# Patient Record
Sex: MALE | Race: WHITE | NOT HISPANIC OR LATINO | Employment: FULL TIME | URBAN - METROPOLITAN AREA
[De-identification: names, ages, dates, MRNs, and addresses within clinical notes are randomized per-mention and may not be internally consistent; named-entity substitution may affect disease eponyms.]

---

## 2017-09-11 DIAGNOSIS — E78.5 HYPERLIPIDEMIA, UNSPECIFIED HYPERLIPIDEMIA TYPE: Primary | ICD-10-CM

## 2017-09-16 ENCOUNTER — RESULTS ENCOUNTER (OUTPATIENT)
Dept: INTERNAL MEDICINE | Facility: CLINIC | Age: 52
End: 2017-09-16

## 2017-09-16 DIAGNOSIS — E78.5 HYPERLIPIDEMIA, UNSPECIFIED HYPERLIPIDEMIA TYPE: ICD-10-CM

## 2018-01-29 LAB
ALBUMIN SERPL-MCNC: 4.4 G/DL (ref 3.5–5.2)
ALBUMIN/GLOB SERPL: 2.2 G/DL
ALP SERPL-CCNC: 68 U/L (ref 40–129)
ALT SERPL-CCNC: 18 U/L (ref 5–41)
AST SERPL-CCNC: 16 U/L (ref 5–40)
BASOPHILS # BLD AUTO: 0.03 10*3/MM3 (ref 0–0.2)
BASOPHILS NFR BLD AUTO: 0.6 % (ref 0–2)
BILIRUB SERPL-MCNC: 1 MG/DL (ref 0.2–1.2)
BUN SERPL-MCNC: 16 MG/DL (ref 6–20)
BUN/CREAT SERPL: 16.2 (ref 7–25)
CALCIUM SERPL-MCNC: 8.9 MG/DL (ref 8.6–10.5)
CHLORIDE SERPL-SCNC: 100 MMOL/L (ref 98–107)
CHOLEST SERPL-MCNC: 179 MG/DL (ref 0–200)
CHOLEST/HDLC SERPL: 3.03 {RATIO}
CO2 SERPL-SCNC: 27.1 MMOL/L (ref 22–29)
CREAT SERPL-MCNC: 0.99 MG/DL (ref 0.76–1.27)
EOSINOPHIL # BLD AUTO: 0.22 10*3/MM3 (ref 0.1–0.3)
EOSINOPHIL NFR BLD AUTO: 4.4 % (ref 0–4)
ERYTHROCYTE [DISTWIDTH] IN BLOOD BY AUTOMATED COUNT: 12.6 % (ref 11.5–14.5)
GFR SERPLBLD CREATININE-BSD FMLA CKD-EPI: 79 ML/MIN/1.73
GFR SERPLBLD CREATININE-BSD FMLA CKD-EPI: 96 ML/MIN/1.73
GLOBULIN SER CALC-MCNC: 2 GM/DL
GLUCOSE SERPL-MCNC: 85 MG/DL (ref 65–99)
HCT VFR BLD AUTO: 42.9 % (ref 42–52)
HDLC SERPL-MCNC: 59 MG/DL (ref 40–60)
HGB BLD-MCNC: 14.6 G/DL (ref 14–18)
IMM GRANULOCYTES # BLD: 0.02 10*3/MM3 (ref 0–0.03)
IMM GRANULOCYTES NFR BLD: 0.4 % (ref 0–0.5)
LDLC SERPL CALC-MCNC: 108 MG/DL (ref 0–100)
LYMPHOCYTES # BLD AUTO: 1.79 10*3/MM3 (ref 0.6–4.8)
LYMPHOCYTES NFR BLD AUTO: 35.4 % (ref 20–45)
MCH RBC QN AUTO: 29.2 PG (ref 27–31)
MCHC RBC AUTO-ENTMCNC: 34 G/DL (ref 31–37)
MCV RBC AUTO: 85.8 FL (ref 80–94)
MONOCYTES # BLD AUTO: 0.48 10*3/MM3 (ref 0–1)
MONOCYTES NFR BLD AUTO: 9.5 % (ref 3–8)
NEUTROPHILS # BLD AUTO: 2.51 10*3/MM3 (ref 1.5–8.3)
NEUTROPHILS NFR BLD AUTO: 49.7 % (ref 45–70)
NRBC BLD AUTO-RTO: 0 /100 WBC (ref 0–0)
PLATELET # BLD AUTO: 179 10*3/MM3 (ref 140–500)
POTASSIUM SERPL-SCNC: 4.1 MMOL/L (ref 3.5–5.2)
PROT SERPL-MCNC: 6.4 G/DL (ref 6–8.5)
RBC # BLD AUTO: 5 10*6/MM3 (ref 4.7–6.1)
SODIUM SERPL-SCNC: 136 MMOL/L (ref 136–145)
TRIGL SERPL-MCNC: 60 MG/DL (ref 0–150)
VLDLC SERPL CALC-MCNC: 12 MG/DL (ref 8–32)
WBC # BLD AUTO: 5.05 10*3/MM3 (ref 4.8–10.8)

## 2018-02-05 ENCOUNTER — OFFICE VISIT (OUTPATIENT)
Dept: INTERNAL MEDICINE | Facility: CLINIC | Age: 53
End: 2018-02-05

## 2018-02-05 VITALS
DIASTOLIC BLOOD PRESSURE: 72 MMHG | OXYGEN SATURATION: 98 % | TEMPERATURE: 98.6 F | HEART RATE: 71 BPM | HEIGHT: 70 IN | WEIGHT: 195 LBS | SYSTOLIC BLOOD PRESSURE: 120 MMHG | BODY MASS INDEX: 27.92 KG/M2

## 2018-02-05 DIAGNOSIS — R51.9 HEADACHE IN BACK OF HEAD: ICD-10-CM

## 2018-02-05 DIAGNOSIS — Z12.5 SCREENING PSA (PROSTATE SPECIFIC ANTIGEN): ICD-10-CM

## 2018-02-05 DIAGNOSIS — Z11.59 NEED FOR HEPATITIS C SCREENING TEST: ICD-10-CM

## 2018-02-05 DIAGNOSIS — Z12.11 SCREEN FOR COLON CANCER: ICD-10-CM

## 2018-02-05 DIAGNOSIS — Z82.49 FH: ANEURYSM: ICD-10-CM

## 2018-02-05 DIAGNOSIS — Z00.00 HEALTH CARE MAINTENANCE: Primary | ICD-10-CM

## 2018-02-05 LAB
BILIRUB BLD-MCNC: NEGATIVE MG/DL
CLARITY, POC: CLEAR
COLOR UR: YELLOW
GLUCOSE UR STRIP-MCNC: NEGATIVE MG/DL
KETONES UR QL: NEGATIVE
LEUKOCYTE EST, POC: NEGATIVE
NITRITE UR-MCNC: NEGATIVE MG/ML
PH UR: 5.5 [PH] (ref 5–8)
POC CREATININE URINE: NORMAL
POC MICROALBUMIN URINE: NORMAL
PROT UR STRIP-MCNC: NEGATIVE MG/DL
RBC # UR STRIP: NEGATIVE /UL
SP GR UR: 1.03 (ref 1–1.03)
UROBILINOGEN UR QL: NORMAL

## 2018-02-05 PROCEDURE — 81003 URINALYSIS AUTO W/O SCOPE: CPT | Performed by: NURSE PRACTITIONER

## 2018-02-05 PROCEDURE — 99396 PREV VISIT EST AGE 40-64: CPT | Performed by: NURSE PRACTITIONER

## 2018-02-05 PROCEDURE — 82044 UR ALBUMIN SEMIQUANTITATIVE: CPT | Performed by: NURSE PRACTITIONER

## 2018-02-05 PROCEDURE — 82570 ASSAY OF URINE CREATININE: CPT | Performed by: NURSE PRACTITIONER

## 2018-02-05 PROCEDURE — 93000 ELECTROCARDIOGRAM COMPLETE: CPT | Performed by: NURSE PRACTITIONER

## 2018-02-05 NOTE — PROGRESS NOTES
Procedure     ECG 12 Lead  Date/Time: 2/5/2018 8:29 AM  Performed by: DINESH OCHOA  Authorized by: DINESH OCHOA   Comparison: compared with previous ECG from 6/22/2016  Rhythm: sinus rhythm  Rate: normal  BPM: 64  ST Segments: ST segments normal  T Waves: T waves normal  QRS axis: normal  Other findings comments: SD 0.20 QRS 0.12  Clinical impression: normal ECG

## 2018-02-05 NOTE — PROGRESS NOTES
"Annual Exam      Asad Jones is being seen for a Complete physical exam. His last physical was 6-. He is working full time as a business owner/. He is . His household includes spouse and 3 children. He is exercising regularly. He does not use tobacco. He drinks alcohol rarely.    Colonoscopy was completed never, he was sent to GI last year. Last labs reviewed from 1- included a lipid panel, PSA, CBC, and CMP.     He started WW diet 2 weeks ago, and has lost 7 pounds. He has a twin brother with CAD and had an MI. He is complaining of right sided headache off and on daily.        History of Present Illness       The following portions of the patient's history were reviewed and updated as appropriate: allergies, current medications, past family history, past medical history, past social history, past surgical history and problem list.    Review of Systems   Constitutional: Negative.    HENT: Negative.  Negative for congestion.    Eyes: Negative.    Respiratory: Negative.    Cardiovascular: Negative.  Negative for chest pain, palpitations and leg swelling.   Gastrointestinal: Negative.    Endocrine: Negative.  Negative for cold intolerance.   Genitourinary: Negative.    Musculoskeletal: Negative.    Skin: Negative.    Allergic/Immunologic: Negative.  Negative for environmental allergies.   Neurological: Negative.  Negative for dizziness.   Hematological: Negative.  Negative for adenopathy. Does not bruise/bleed easily.   Psychiatric/Behavioral: Negative.        Objective          /72  Pulse 71  Temp 98.6 °F (37 °C) (Oral)   Ht 177.8 cm (70\")  Wt 88.5 kg (195 lb)  SpO2 98%  BMI 27.98 kg/m2    General Appearance:    Alert, cooperative, no distress, appears stated age   Head:    Normocephalic, without obvious abnormality, atraumatic   Eyes:    PERRL, conjunctiva/corneas clear, EOM's intact, fundi     benign, both eyes        Ears:    Normal TM's and external ear canals, both " ears   Nose:   Nares normal, septum midline, mucosa normal, no drainage    or sinus tenderness   Throat:   Lips, mucosa, and tongue normal; teeth and gums normal   Neck:   Supple, symmetrical, trachea midline, no adenopathy;        thyroid:  No enlargement/tenderness/nodules; no carotid    bruit or JVD   Back:     Symmetric, no curvature, ROM normal, no CVA tenderness   Lungs:     Clear to auscultation bilaterally, respirations unlabored   Chest wall:    No tenderness or deformity   Heart:    Regular rate and rhythm, S1 and S2 normal, no murmur, rub    or gallop   Abdomen:     Soft, non-tender, bowel sounds active all four quadrants,     no masses, no organomegaly   Genitalia:    deferred   Rectal:    deferred   Extremities:   Extremities normal, atraumatic, no cyanosis or edema   Pulses:   2+ and symmetric all extremities   Skin:   Skin color, texture, turgor normal, no rashes or lesions   Lymph nodes:   Cervical, supraclavicular, and axillary nodes normal   Neurologic:   CNII-XII intact. Normal strength, sensation and reflexes      throughout            Assessment/Plan   Asad was seen today for annual exam.    Diagnoses and all orders for this visit:    Health care maintenance  -     ECG 12 Lead    Screening PSA (prostate specific antigen)  -     PSA Screen  -     PSA DIAGNOSTIC    Screen for colon cancer  -     Ambulatory Referral to Gastroenterology    Need for hepatitis C screening test  -     Hepatitis C Antibody    Will Rule Out Aneurysm with MRA due to chronic headache. Follow up with an AWV in 1 year.

## 2018-02-06 LAB
HCV AB S/CO SERPL IA: <0.1 S/CO RATIO (ref 0–0.9)
PSA SERPL-MCNC: 0.61 NG/ML (ref 0–4)

## 2018-02-19 ENCOUNTER — TELEPHONE (OUTPATIENT)
Dept: GASTROENTEROLOGY | Facility: CLINIC | Age: 53
End: 2018-02-19

## 2018-02-19 DIAGNOSIS — Z12.11 COLON CANCER SCREENING: Primary | ICD-10-CM

## 2018-03-01 PROBLEM — Z12.11 COLON CANCER SCREENING: Status: ACTIVE | Noted: 2018-03-01

## 2018-03-01 RX ORDER — PEG-3350, SODIUM SULFATE, SODIUM CHLORIDE, POTASSIUM CHLORIDE, SODIUM ASCORBATE AND ASCORBIC ACID 7.5-2.691G
KIT ORAL
Qty: 1 EACH | Refills: 0 | Status: ON HOLD | OUTPATIENT
Start: 2018-03-01 | End: 2018-03-30

## 2018-03-01 NOTE — TELEPHONE ENCOUNTER
Patient has been scheduled on 3/30/18 to arrive at 9:00am. Instructions have been emailed to them.

## 2018-03-09 ENCOUNTER — HOSPITAL ENCOUNTER (OUTPATIENT)
Dept: MRI IMAGING | Facility: HOSPITAL | Age: 53
Discharge: HOME | End: 2018-03-09
Admitting: NURSE PRACTITIONER

## 2018-03-09 DIAGNOSIS — R51.9 HEADACHE IN BACK OF HEAD: ICD-10-CM

## 2018-03-09 DIAGNOSIS — Z82.49 FH: ANEURYSM: ICD-10-CM

## 2018-03-09 PROCEDURE — 70544 MR ANGIOGRAPHY HEAD W/O DYE: CPT

## 2018-03-29 ENCOUNTER — ANESTHESIA EVENT (OUTPATIENT)
Dept: PERIOP | Facility: HOSPITAL | Age: 53
End: 2018-03-29

## 2018-03-30 ENCOUNTER — ANESTHESIA (OUTPATIENT)
Dept: PERIOP | Facility: HOSPITAL | Age: 53
End: 2018-03-30

## 2018-03-30 ENCOUNTER — HOSPITAL ENCOUNTER (OUTPATIENT)
Facility: HOSPITAL | Age: 53
Setting detail: HOSPITAL OUTPATIENT SURGERY
Discharge: HOME | End: 2018-03-30
Attending: INTERNAL MEDICINE | Admitting: INTERNAL MEDICINE

## 2018-03-30 VITALS
WEIGHT: 183.8 LBS | HEIGHT: 70 IN | HEART RATE: 62 BPM | TEMPERATURE: 98 F | SYSTOLIC BLOOD PRESSURE: 122 MMHG | OXYGEN SATURATION: 95 % | DIASTOLIC BLOOD PRESSURE: 82 MMHG | BODY MASS INDEX: 26.31 KG/M2 | RESPIRATION RATE: 18 BRPM

## 2018-03-30 DIAGNOSIS — Z12.11 COLON CANCER SCREENING: ICD-10-CM

## 2018-03-30 PROCEDURE — 25010000002 PROPOFOL 10 MG/ML EMULSION: Performed by: NURSE ANESTHETIST, CERTIFIED REGISTERED

## 2018-03-30 PROCEDURE — 45380 COLONOSCOPY AND BIOPSY: CPT | Performed by: INTERNAL MEDICINE

## 2018-03-30 RX ORDER — MAGNESIUM HYDROXIDE 1200 MG/15ML
LIQUID ORAL AS NEEDED
Status: DISCONTINUED | OUTPATIENT
Start: 2018-03-30 | End: 2018-03-30 | Stop reason: HOSPADM

## 2018-03-30 RX ORDER — LIDOCAINE HYDROCHLORIDE 10 MG/ML
0.5 INJECTION, SOLUTION EPIDURAL; INFILTRATION; INTRACAUDAL; PERINEURAL ONCE AS NEEDED
Status: COMPLETED | OUTPATIENT
Start: 2018-03-30 | End: 2018-03-30

## 2018-03-30 RX ORDER — LIDOCAINE HYDROCHLORIDE 20 MG/ML
INJECTION, SOLUTION INFILTRATION; PERINEURAL AS NEEDED
Status: DISCONTINUED | OUTPATIENT
Start: 2018-03-30 | End: 2018-03-30 | Stop reason: SURG

## 2018-03-30 RX ORDER — SODIUM CHLORIDE, SODIUM LACTATE, POTASSIUM CHLORIDE, CALCIUM CHLORIDE 600; 310; 30; 20 MG/100ML; MG/100ML; MG/100ML; MG/100ML
9 INJECTION, SOLUTION INTRAVENOUS CONTINUOUS
Status: DISCONTINUED | OUTPATIENT
Start: 2018-03-30 | End: 2018-03-30 | Stop reason: HOSPADM

## 2018-03-30 RX ORDER — SODIUM CHLORIDE 9 MG/ML
40 INJECTION, SOLUTION INTRAVENOUS AS NEEDED
Status: DISCONTINUED | OUTPATIENT
Start: 2018-03-30 | End: 2018-03-30 | Stop reason: HOSPADM

## 2018-03-30 RX ORDER — SODIUM CHLORIDE 0.9 % (FLUSH) 0.9 %
1-10 SYRINGE (ML) INJECTION AS NEEDED
Status: DISCONTINUED | OUTPATIENT
Start: 2018-03-30 | End: 2018-03-30 | Stop reason: HOSPADM

## 2018-03-30 RX ORDER — PROPOFOL 10 MG/ML
VIAL (ML) INTRAVENOUS AS NEEDED
Status: DISCONTINUED | OUTPATIENT
Start: 2018-03-30 | End: 2018-03-30 | Stop reason: SURG

## 2018-03-30 RX ORDER — GLYCOPYRROLATE 0.2 MG/ML
INJECTION INTRAMUSCULAR; INTRAVENOUS AS NEEDED
Status: DISCONTINUED | OUTPATIENT
Start: 2018-03-30 | End: 2018-03-30 | Stop reason: SURG

## 2018-03-30 RX ADMIN — GLYCOPYRROLATE 0.2 MG: 0.2 INJECTION INTRAMUSCULAR; INTRAVENOUS at 09:48

## 2018-03-30 RX ADMIN — LIDOCAINE HYDROCHLORIDE 0.1 ML: 10 INJECTION, SOLUTION EPIDURAL; INFILTRATION; INTRACAUDAL; PERINEURAL at 09:20

## 2018-03-30 RX ADMIN — SODIUM CHLORIDE, POTASSIUM CHLORIDE, SODIUM LACTATE AND CALCIUM CHLORIDE 9 ML/HR: 600; 310; 30; 20 INJECTION, SOLUTION INTRAVENOUS at 09:20

## 2018-03-30 RX ADMIN — PROPOFOL 50 MG: 10 INJECTION, EMULSION INTRAVENOUS at 10:04

## 2018-03-30 RX ADMIN — PROPOFOL 50 MG: 10 INJECTION, EMULSION INTRAVENOUS at 10:12

## 2018-03-30 RX ADMIN — PROPOFOL 50 MG: 10 INJECTION, EMULSION INTRAVENOUS at 09:53

## 2018-03-30 RX ADMIN — PROPOFOL 50 MG: 10 INJECTION, EMULSION INTRAVENOUS at 09:56

## 2018-03-30 RX ADMIN — PROPOFOL 100 MG: 10 INJECTION, EMULSION INTRAVENOUS at 09:51

## 2018-03-30 RX ADMIN — LIDOCAINE HYDROCHLORIDE 100 MG: 20 INJECTION, SOLUTION INFILTRATION; PERINEURAL at 09:51

## 2018-03-30 RX ADMIN — PROPOFOL 50 MG: 10 INJECTION, EMULSION INTRAVENOUS at 10:00

## 2018-03-30 RX ADMIN — PROPOFOL 50 MG: 10 INJECTION, EMULSION INTRAVENOUS at 10:08

## 2018-03-30 NOTE — ANESTHESIA POSTPROCEDURE EVALUATION
Patient: Asad Jones    Procedure Summary     Date:  03/30/18 Room / Location:  [facility] ENDOSCOPY 2 /  [facility]    Anesthesia Start:  0945 Anesthesia Stop:  1019    Procedure:  COLONOSCOPY,  polypectomy (N/A ) Diagnosis:       Colon cancer screening      (Colon cancer screening [Z12.11])    Surgeon:  Danita Springer MD Provider:  Maurizio Hernández CRNA    Anesthesia Type:  MAC ASA Status:  2          Anesthesia Type: MAC  Last vitals  BP   122/82 (03/30/18 1050)   Temp   98 °F (36.7 °C) (03/30/18 1022)   Pulse   62 (03/30/18 1050)   Resp   18 (03/30/18 1050)     SpO2   95 % (03/30/18 1050)     Post Anesthesia Care and Evaluation    Patient location during evaluation: PHASE II  Patient participation: complete - patient participated  Level of consciousness: awake and alert  Pain management: adequate  Airway patency: patent  Anesthetic complications: No anesthetic complications  PONV Status: none  Cardiovascular status: acceptable and hemodynamically stable  Respiratory status: acceptable  Hydration status: acceptable

## 2018-04-03 LAB
LAB AP CASE REPORT: NORMAL
Lab: NORMAL
PATH REPORT.FINAL DX SPEC: NORMAL

## 2018-04-19 ENCOUNTER — OFFICE VISIT (OUTPATIENT)
Dept: INTERNAL MEDICINE | Facility: CLINIC | Age: 53
End: 2018-04-19

## 2018-04-19 VITALS
SYSTOLIC BLOOD PRESSURE: 130 MMHG | HEART RATE: 75 BPM | WEIGHT: 187 LBS | HEIGHT: 70 IN | OXYGEN SATURATION: 98 % | DIASTOLIC BLOOD PRESSURE: 84 MMHG | BODY MASS INDEX: 26.77 KG/M2 | TEMPERATURE: 97.8 F

## 2018-04-19 DIAGNOSIS — M54.32 SCIATICA OF LEFT SIDE: Primary | ICD-10-CM

## 2018-04-19 PROCEDURE — 99213 OFFICE O/P EST LOW 20 MIN: CPT | Performed by: INTERNAL MEDICINE

## 2018-04-19 RX ORDER — PREDNISONE 10 MG/1
TABLET ORAL
Qty: 30 TABLET | Refills: 0 | Status: SHIPPED | OUTPATIENT
Start: 2018-04-19 | End: 2019-04-17

## 2018-04-19 RX ORDER — GABAPENTIN 300 MG/1
300 CAPSULE ORAL NIGHTLY
Qty: 30 CAPSULE | Refills: 0 | Status: SHIPPED | OUTPATIENT
Start: 2018-04-19 | End: 2018-05-21 | Stop reason: SDUPTHER

## 2018-04-19 NOTE — PATIENT INSTRUCTIONS
Sciatica Rehab  Ask your health care provider which exercises are safe for you. Do exercises exactly as told by your health care provider and adjust them as directed. It is normal to feel mild stretching, pulling, tightness, or discomfort as you do these exercises, but you should stop right away if you feel sudden pain or your pain gets worse. Do not begin these exercises until told by your health care provider.  Stretching and range of motion exercises  These exercises warm up your muscles and joints and improve the movement and flexibility of your hips and your back. These exercises also help to relieve pain, numbness, and tingling.  Exercise A: Sciatic nerve glide   1. Sit in a chair with your head facing down toward your chest. Place your hands behind your back. Let your shoulders slump forward.  2. Slowly straighten one of your knees while you tilt your head back as if you are looking toward the ceiling. Only straighten your leg as far as you can without making your symptoms worse.  3. Hold for __________ seconds.  4. Slowly return to the starting position.  5. Repeat with your other leg.  Repeat __________ times. Complete this exercise __________ times a day.  Exercise B: Knee to chest with hip adduction and internal rotation     1. Lie on your back on a firm surface with both legs straight.  2. Bend one of your knees and move it up toward your chest until you feel a gentle stretch in your lower back and buttock. Then, move your knee toward the shoulder that is on the opposite side from your leg.  ¨ Hold your leg in this position by holding onto the front of your knee.  3. Hold for __________ seconds.  4. Slowly return to the starting position.  5. Repeat with your other leg.  Repeat __________ times. Complete this exercise __________ times a day.  Exercise C: Prone extension on elbows     1. Lie on your abdomen on a firm surface. A bed may be too soft for this exercise.  2. Prop yourself up on your  elbows.  3. Use your arms to help lift your chest up until you feel a gentle stretch in your abdomen and your lower back.  ¨ This will place some of your body weight on your elbows. If this is uncomfortable, try stacking pillows under your chest.  ¨ Your hips should stay down, against the surface that you are lying on. Keep your hip and back muscles relaxed.  4. Hold for __________ seconds.  5. Slowly relax your upper body and return to the starting position.  Repeat __________ times. Complete this exercise __________ times a day.  Strengthening exercises  These exercises build strength and endurance in your back. Endurance is the ability to use your muscles for a long time, even after they get tired.  Exercise D: Pelvic tilt   1. Lie on your back on a firm surface. Bend your knees and keep your feet flat.  2. Tense your abdominal muscles. Tip your pelvis up toward the ceiling and flatten your lower back into the floor.  ¨ To help with this exercise, you may place a small towel under your lower back and try to push your back into the towel.  3. Hold for __________ seconds.  4. Let your muscles relax completely before you repeat this exercise.  Repeat __________ times. Complete this exercise __________ times a day.  Exercise E: Alternating arm and leg raises     1. Get on your hands and knees on a firm surface. If you are on a hard floor, you may want to use padding to cushion your knees, such as an exercise mat.  2. Line up your arms and legs. Your hands should be below your shoulders, and your knees should be below your hips.  3. Lift your left leg behind you. At the same time, raise your right arm and straighten it in front of you.  ¨ Do not lift your leg higher than your hip.  ¨ Do not lift your arm higher than your shoulder.  ¨ Keep your abdominal and back muscles tight.  ¨ Keep your hips facing the ground.  ¨ Do not arch your back.  ¨ Keep your balance carefully, and do not hold your breath.  4. Hold for  __________ seconds.  5. Slowly return to the starting position and repeat with your right leg and your left arm.  Repeat __________ times. Complete this exercise __________ times a day.  Posture and body mechanics     Body mechanics refers to the movements and positions of your body while you do your daily activities. Posture is part of body mechanics. Good posture and healthy body mechanics can help to relieve stress in your body's tissues and joints. Good posture means that your spine is in its natural S-curve position (your spine is neutral), your shoulders are pulled back slightly, and your head is not tipped forward. The following are general guidelines for applying improved posture and body mechanics to your everyday activities.  Standing     · When standing, keep your spine neutral and your feet about hip-width apart. Keep a slight bend in your knees. Your ears, shoulders, and hips should line up.  · When you do a task in which you  one place for a long time, place one foot up on a stable object that is 2-4 inches (5-10 cm) high, such as a footstool. This helps keep your spine neutral.  Sitting     · When sitting, keep your spine neutral and keep your feet flat on the floor. Use a footrest, if necessary, and keep your thighs parallel to the floor. Avoid rounding your shoulders, and avoid tilting your head forward.  · When working at a desk or a computer, keep your desk at a height where your hands are slightly lower than your elbows. Slide your chair under your desk so you are close enough to maintain good posture.  · When working at a computer, place your monitor at a height where you are looking straight ahead and you do not have to tilt your head forward or downward to look at the screen.  Resting     · When lying down and resting, avoid positions that are most painful for you.  · If you have pain with activities such as sitting, bending, stooping, or squatting (flexion-based activities), lie in a  position in which your body does not bend very much. For example, avoid curling up on your side with your arms and knees near your chest (fetal position).  · If you have pain with activities such as standing for a long time or reaching with your arms (extension-based activities), lie with your spine in a neutral position and bend your knees slightly. Try the following positions:  ¨ Lying on your side with a pillow between your knees.  ¨ Lying on your back with a pillow under your knees.  Lifting     · When lifting objects, keep your feet at least shoulder-width apart and tighten your abdominal muscles.  · Bend your knees and hips and keep your spine neutral. It is important to lift using the strength of your legs, not your back. Do not lock your knees straight out.  · Always ask for help to lift heavy or awkward objects.  This information is not intended to replace advice given to you by your health care provider. Make sure you discuss any questions you have with your health care provider.  Document Released: 12/18/2006 Document Revised: 08/24/2017 Document Reviewed: 09/02/2016  [employer]vier Interactive Patient Education © 2017 [employer]

## 2018-04-19 NOTE — PROGRESS NOTES
"      Asad Jones is a 53 y.o. male, who presents with a chief complaint of   Chief Complaint   Patient presents with   • Leg Pain     L leg, sharp, throbbing pain at times, patient states feels like poss pulled muscle, cannot sleep at night        54 yo M here for left sided leg pain that feels sharp and \"twinges\". Worse with laying in bed or stretching out in his recliner at home. Has been going on for about one week. No back pain now, he is a  for a living. No swelling of the leg or redness. Has not taken anything for the pain. Better with walking. No blood clots in his family. No recent injuries.          The following portions of the patient's history were reviewed and updated as appropriate: allergies, current medications, past family history, past medical history, past social history, past surgical history and problem list.    Allergies: Naproxen    Current Outpatient Prescriptions:   •  aspirin 81 MG EC tablet, Take 81 mg by mouth daily., Disp: , Rfl:   •  Multiple Vitamin (MULTI-VITAMIN DAILY PO), Take 1 tablet by mouth Daily., Disp: , Rfl:   •  Red Yeast Rice 600 MG tablet, Take 1 tablet by mouth daily., Disp: 30 tablet, Rfl: 0  •  gabapentin (NEURONTIN) 300 MG capsule, Take 1 capsule by mouth Every Night., Disp: 30 capsule, Rfl: 0  •  predniSONE (DELTASONE) 10 MG tablet, 50mg PO Qam x 2 days, 40mg PO Qam x 2 days, 30mg PO Qam x 2 days, 20mg PO Qam x 2 days, 10mg PO Qam x 2 days,, Disp: 30 tablet, Rfl: 0  There are no discontinued medications.    Review of Systems   Constitutional: Negative for chills, fatigue and fever.   Respiratory: Negative for shortness of breath.    Musculoskeletal: Positive for myalgias (left leg pain ). Negative for back pain.             /84 (BP Location: Right arm, Patient Position: Sitting, Cuff Size: Adult)   Pulse 75   Temp 97.8 °F (36.6 °C) (Oral)   Ht 177.8 cm (70\")   Wt 84.8 kg (187 lb)   SpO2 98%   BMI 26.83 kg/m²       Physical Exam "   Constitutional: He is oriented to person, place, and time. He appears well-developed and well-nourished. No distress.   HENT:   Head: Normocephalic and atraumatic.   Right Ear: External ear normal.   Left Ear: External ear normal.   Mouth/Throat: Oropharynx is clear and moist. No oropharyngeal exudate.   Eyes: Conjunctivae are normal. Right eye exhibits no discharge. Left eye exhibits no discharge. No scleral icterus.   Neck: Neck supple.   Cardiovascular: Normal rate, regular rhythm and normal heart sounds.  Exam reveals no gallop and no friction rub.    No murmur heard.  Pulmonary/Chest: Effort normal and breath sounds normal. No respiratory distress. He has no wheezes. He has no rales.   Musculoskeletal:        Lumbar back: Normal.        Left upper leg: Normal.        Right lower leg: Normal.        Left lower leg: Normal.   Lymphadenopathy:     He has no cervical adenopathy.   Neurological: He is alert and oriented to person, place, and time. He displays no atrophy. He exhibits normal muscle tone. Coordination and gait normal.   Reflex Scores:       Patellar reflexes are 2+ on the right side and 2+ on the left side.  Skin: Skin is warm. No rash noted.   Psychiatric: He has a normal mood and affect. His behavior is normal.   Nursing note and vitals reviewed.        Results for orders placed or performed during the hospital encounter of 03/30/18   Tissue Pathology Exam   Result Value Ref Range    Case Report       Surgical Pathology Report                         Case: [id number]                                  Authorizing Provider:  Danita Springer MD          Collected:           03/30/2018 10:02 AM          Ordering Location:     [facility]   Received:            03/30/2018 10:56 AM                                 OR                                                                           Pathologist:           Gatito Hutchins MD                                                       Specimens:   1) - Large Intestine, Left / Descending Colon, descending colon polyp                               2) - Large Intestine, Sigmoid Colon, sigmoid polyp                                         Final Diagnosis       Testing performed at outside laboratory. See scanned report.        Embedded Images             Asad was seen today for leg pain.    Diagnoses and all orders for this visit:    Sciatica of left side    Other orders  -     predniSONE (DELTASONE) 10 MG tablet; 50mg PO Qam x 2 days, 40mg PO Qam x 2 days, 30mg PO Qam x 2 days, 20mg PO Qam x 2 days, 10mg PO Qam x 2 days,  -     gabapentin (NEURONTIN) 300 MG capsule; Take 1 capsule by mouth Every Night.      Back exercises   Steroids to help with inflammation   Gabapentin at night to help with sleep and pain   Return if not better  NSAID during day, tolerates Ibuprofen and can take that. Allergic to Aleve   Return if symptoms worsen or fail to improve.    Elena Heller MD  04/19/2018

## 2018-04-24 PROBLEM — D12.5 ADENOMATOUS POLYP OF SIGMOID COLON: Status: ACTIVE | Noted: 2018-04-24

## 2018-05-21 RX ORDER — GABAPENTIN 300 MG/1
300 CAPSULE ORAL NIGHTLY
Qty: 90 CAPSULE | Refills: 1 | Status: SHIPPED | OUTPATIENT
Start: 2018-05-21 | End: 2019-05-16

## 2019-04-17 ENCOUNTER — OFFICE VISIT (OUTPATIENT)
Dept: INTERNAL MEDICINE | Facility: CLINIC | Age: 54
End: 2019-04-17

## 2019-04-17 VITALS
SYSTOLIC BLOOD PRESSURE: 124 MMHG | BODY MASS INDEX: 28.63 KG/M2 | TEMPERATURE: 97.7 F | RESPIRATION RATE: 16 BRPM | DIASTOLIC BLOOD PRESSURE: 86 MMHG | HEART RATE: 72 BPM | HEIGHT: 70 IN | WEIGHT: 200 LBS | OXYGEN SATURATION: 99 %

## 2019-04-17 DIAGNOSIS — B02.9 HERPES ZOSTER WITHOUT COMPLICATION: Primary | ICD-10-CM

## 2019-04-17 PROCEDURE — 99213 OFFICE O/P EST LOW 20 MIN: CPT | Performed by: NURSE PRACTITIONER

## 2019-04-17 RX ORDER — VALACYCLOVIR HYDROCHLORIDE 1 G/1
1000 TABLET, FILM COATED ORAL 3 TIMES DAILY
Qty: 21 TABLET | Refills: 0 | Status: SHIPPED | OUTPATIENT
Start: 2019-04-17 | End: 2020-08-11

## 2019-04-17 NOTE — PATIENT INSTRUCTIONS
Live Zoster (Shingles) Vaccine, ZVL: What You Need to Know  1. What is shingles?  Shingles (also called herpes zoster, or just zoster) is a painful skin rash, often with blisters. Shingles is caused by the varicella zoster virus, the same virus that causes chickenpox. After you have chickenpox, the virus stays in your body and can cause shingles later in life.  You can't catch shingles from another person. However, a person who has never had chickenpox (or chickenpox vaccine) could get chickenpox from someone with shingles.  A shingles rash usually appears on one side of the face or body and heals within 2 to 4 weeks. Its main symptom is pain, which can be severe. Other symptoms can include fever, headache, chills, and upset stomach. Very rarely, a shingles infection can lead to pneumonia, hearing problems, blindness, brain inflammation (encephalitis), or death.  For about 1 person in 5, severe pain can continue even long after the rash has cleared up. This long-lasting pain is called post-herpetic neuralgia (PHN).  Shingles is far more common in people 50 years of age and older than in younger people, and the risk increases with age. It is also more common in people whose immune system is weakened because of a disease such as cancer or by drugs such as steroids or chemotherapy.  At least 1 million people a year in the [place] get shingles.  2. Shingles vaccine (live)  A live shingles vaccine was approved by FDA in 2006. In a clinical trial, the vaccine reduced the risk of shingles by about 50% in people 60 and older. It can reduce the likelihood of PHN, and reduce pain in some people who still get shingles after being vaccinated.  The recommended schedule for live shingles vaccine is a single dose for adults 60 years of age and older.  3. Some people should not get this vaccine  Tell your vaccine provider if you:  · Have any severe, life-threatening allergies. A person who has ever had a life-threatening  allergic reaction after a dose of live shingles vaccine, or has a severe allergy to any component of this vaccine, may be advised not to be vaccinated. Ask your health care provider if you want information about vaccine components.  · Are pregnant, or think you might be pregnant. Pregnant women should wait to get live shingles vaccine until they are no longer pregnant. Women should avoid getting pregnant for at least 1 month after getting shingles vaccine.  · Have a weakened immune system due to disease (such as cancer or AIDS) or medical treatments (such as radiation, immunotherapy, high-dose steroids, or chemotherapy).  · Are not feeling well. If you have a mild illness, such as a cold, you can probably get the vaccine today. If you are moderately or severely ill, you should probably wait until you recover. Your doctor can advise you.    4. Risks of a vaccine reaction  With any medicine, including vaccines, there is a chance of reactions.  After live shingles vaccination, a person might experience:  · Redness, soreness, swelling, or itching at the site of the injection  · Headache    These events are usually mild and go away on their own.  Rarely, live shingles vaccine can cause rash or shingles.  Other things that could happen after this vaccine:  · People sometimes faint after medical procedures, including vaccination. Sitting or lying down for about 15 minutes can help prevent fainting and injuries caused by a fall. Tell your provider if you feel dizzy or have vision changes or ringing in the ears.  · Some people get shoulder pain that can be more severe and longer-lasting than routine soreness that can follow injections. This happens very rarely.  · Any medication can cause a severe allergic reaction. Such reactions to a vaccine are estimated at about 1 in a million doses, and would happen within a few minutes to a few hours after the vaccination.  As with any medicine, there is a very remote chance of a  vaccine causing a serious injury or death.  The safety of vaccines is always being monitored. For more information, visit: www.cdc.gov/vaccinesafety/  5. What if there is a serious problem?  What should I look for?  · Look for anything that concerns you, such as signs of a severe allergic reaction, very high fever, or unusual behavior.  Signs of a severe allergic reaction can include hives, swelling of the face and throat, difficulty breathing, a fast heartbeat, dizziness, and weakness. These would usually start a few minutes to a few hours after the vaccination.  What should I do?  · If you think it is a severe allergic reaction or other emergency that can't wait, call 9-1-1 and get to the nearest hospital. Otherwise, call your health care provider.  · Afterward, the reaction should be reported to the Vaccine Adverse Event Reporting System (VAERS). Your doctor should file this report, or you can do it yourself through the VAERS web site at www.vaers.[facility].gov or by calling 1-657.993.6845.  VAERS does not give medical advice.  6. How can I learn more?  · Ask your healthcare provider. He or she can give you the vaccine package insert or suggest other sources of information.  · Call your local or state health department.  · Contact the Centers for Disease Control and Prevention (CDC):  ? Call 1-110.454.7471(5-442-ZFK-INFO) or  ? Visit CDC's website at www.cdc.gov/vaccines  CDC Vaccine Information Statement (VIS) Live Zoster Vaccine (02/12/2018)  This information is not intended to replace advice given to you by your health care provider. Make sure you discuss any questions you have with your health care provider.  Document Released: 10/15/2007 Document Revised: 02/27/2018 Document Reviewed: 02/27/2018  Elsevier Interactive Patient Education © 2019 [employer]  Shingles  Shingles, which is also known as herpes zoster, is an infection that causes a painful skin rash and fluid-filled blisters. It is caused by a  virus.  Shingles only develops in people who:  · Have had chickenpox.  · Have been given a medicine to protect against chickenpox (have been vaccinated). Shingles is rare in this group.    What are the causes?  Shingles is caused by varicella-zoster virus (VZV). This is the same virus that causes chickenpox. After a person is exposed to VZV, the virus stays in the body in an inactive (dormant) state. Shingles develops if the virus is reactivated. This can happen many years after the first (initial) exposure to VZV. It is not known what causes this virus to be reactivated.  What increases the risk?  People who have had chickenpox or received the chickenpox vaccine are at risk for shingles. Shingles infection is more common in people who:  · Are older than age 60.  · Have a weakened disease-fighting system (immunesystem), such as people with:  ? HIV.  ? AIDS.  ? Cancer.  · Are taking medicines that weaken the immune system, such as transplant medicines.  · Are experiencing a lot of stress.    What are the signs or symptoms?  Early symptoms of this condition include itching, tingling, and pain in an area on your skin. Pain may be described as burning, stabbing, or throbbing.  A few days or weeks after early symptoms start, a painful red rash appears. The rash is usually on one side of the body and has a band-like or belt-like pattern. The rash eventually turns into fluid-filled blisters that break open, change into scabs, and dry up in about 2-3 weeks.  At any time during the infection, you may also develop:  · A fever.  · Chills.  · A headache.  · An upset stomach.    How is this diagnosed?  This condition is diagnosed with a skin exam. Skin or fluid samples may be taken from the blisters before a diagnosis is made. These samples are examined under a microscope or sent to a lab for testing.  How is this treated?  The rash may last for several weeks. There is not a specific cure for this condition. Your health care  provider will probably prescribe medicines to help you manage pain, recover more quickly, and avoid long-term problems. Medicines may include:  · Antiviral drugs.  · Anti-inflammatory drugs.  · Pain medicines.  · Anti-itching medicines (antihistamines).    If the area involved is on your face, you may be referred to a specialist, such as an eye doctor (ophthalmologist) or an ear, nose, and throat (ENT) doctor (otolaryngologist) to help you avoid eye problems, chronic pain, or disability.  Follow these instructions at home:  Medicines  · Take over-the-counter and prescription medicines only as told by your health care provider.  · Apply an anti-itch cream or numbing cream to the affected area as told by your health care provider.  Relieving itching and discomfort  · Apply cold, wet cloths (cold compresses) to the area of the rash or blisters as told by your health care provider.  · Cool baths can be soothing. Try adding baking soda or dry oatmeal to the water to reduce itching. Do not bathe in hot water.  Blister and rash care  · Keep your rash covered with a loose bandage (dressing). Wear loose-fitting clothing to help ease the pain of material rubbing against the rash.  · Keep your rash and blisters clean by washing the area with mild soap and cool water as told by your health care provider.  · Check your rash every day for signs of infection. Check for:  ? More redness, swelling, or pain.  ? Fluid or blood.  ? Warmth.  ? Pus or a bad smell.  · Do not scratch your rash or pick at your blisters. To help avoid scratching:  ? Keep your fingernails clean and cut short.  ? Wear gloves or mittens while you sleep, if scratching is a problem.  General instructions  · Rest as told by your health care provider.  · Keep all follow-up visits as told by your health care provider. This is important.  · Wash your hands often with soap and water. If soap and water are not available, use hand . Doing this lowers your  chance of getting a bacterial skin infection.  · Before your blisters change into scabs, your shingles infection can cause chickenpox in people who have never had it or have never been vaccinated against it. To prevent this from happening, avoid contact with other people, especially:  ? Babies.  ? Pregnant women.  ? Children who have eczema.  ? Elderly people who have transplants.  ? People who have chronic illnesses, such as cancer or AIDS.  Contact a health care provider if:  · Your pain is not relieved with prescribed medicines.  · Your pain does not get better after the rash heals.  · You have signs of infection in the rash area, such as:  ? More redness, swelling, or pain around the rash.  ? Fluid or blood coming from the rash.  ? The rash area feeling warm to the touch.  ? Pus or a bad smell coming from the rash.  Get help right away if:  · The rash is on your face or nose.  · You have facial pain, pain around your eye area, or loss of feeling on one side of your face.  · You have difficulty seeing.  · You have ear pain or have ringing in your ear.  · You have a loss of taste.  · Your condition gets worse.  Summary  · Shingles, which is also known as herpes zoster, is an infection that causes a painful skin rash and fluid-filled blisters.  · This condition is diagnosed with a skin exam. Skin or fluid samples may be taken from the blisters and examined before the diagnosis is made.  · Keep your rash covered with a loose bandage (dressing). Wear loose-fitting clothing to help ease the pain of material rubbing against the rash.  · Before your blisters change into scabs, your shingles infection can cause chickenpox in people who have never had it or have never been vaccinated against it.  This information is not intended to replace advice given to you by your health care provider. Make sure you discuss any questions you have with your health care provider.  Document Released: 12/18/2006 Document Revised:  08/22/2018 Document Reviewed: 08/22/2018  [employer] Interactive Patient Education © 2019 [employer].

## 2019-04-17 NOTE — PROGRESS NOTES
"Chief Complaint   Patient presents with   • Rash   • Fatigue   • Skin Problem     \"feels sunburned\"        Subjective   Asad Jones is a 54 y.o. male is being seen for an acute appointment for rash to right side. He started with body aches about 1 week ago, and then 3 days ago, he started with rash to right side of body. The rash is tender to touch \"like a sunburn\" Denies tick bites, fever. He took benadryl without any help.     History of Present Illness     Current Outpatient Medications on File Prior to Visit   Medication Sig Dispense Refill   • aspirin 81 MG EC tablet Take 81 mg by mouth daily.     • gabapentin (NEURONTIN) 300 MG capsule Take 1 capsule by mouth Every Night. 90 capsule 1   • Multiple Vitamin (MULTI-VITAMIN DAILY PO) Take 1 tablet by mouth Daily.     • Red Yeast Rice 600 MG tablet Take 1 tablet by mouth daily. 30 tablet 0   • [DISCONTINUED] predniSONE (DELTASONE) 10 MG tablet 50mg PO Qam x 2 days, 40mg PO Qam x 2 days, 30mg PO Qam x 2 days, 20mg PO Qam x 2 days, 10mg PO Qam x 2 days, 30 tablet 0     No current facility-administered medications on file prior to visit.        The following portions of the patient's history were reviewed and updated as appropriate: allergies, current medications, past family history, past medical history, past social history, past surgical history and problem list.    Review of Systems   Constitutional: Positive for fatigue.   HENT: Negative.    Respiratory: Negative.    Cardiovascular: Negative.  Negative for chest pain, palpitations and leg swelling.   Genitourinary: Negative.    Musculoskeletal: Positive for arthralgias.   Skin: Positive for rash.   Allergic/Immunologic: Negative for food allergies.   Neurological: Negative.    Hematological: Negative.    Psychiatric/Behavioral: Negative.        Objective   Physical Exam   Constitutional: He is oriented to person, place, and time. He appears well-developed and well-nourished. No distress.   Neck: Neck " supple.   Cardiovascular: Normal rate and regular rhythm.   Pulmonary/Chest: Effort normal and breath sounds normal. No stridor. No respiratory distress.   Musculoskeletal: He exhibits no edema.   Neurological: He is alert and oriented to person, place, and time.   Skin: Skin is warm. Rash (right sided vesicular flank rash in a dermatone pattern.  tender to touch) noted.   Psychiatric: He has a normal mood and affect. His behavior is normal.   Vitals reviewed.      Assessment/Plan      Asad was seen today for rash, fatigue and skin problem.    Diagnoses and all orders for this visit:    Herpes zoster without complication  -     valACYclovir (VALTREX) 1000 MG tablet; Take 1 tablet by mouth 3 (Three) Times a Day.      Discussed etiology of shingles, natural course, PHN risk and Shingrix vaccine. Information given.    Follow up as needed.

## 2019-04-19 ENCOUNTER — TELEPHONE (OUTPATIENT)
Dept: INTERNAL MEDICINE | Facility: CLINIC | Age: 54
End: 2019-04-19

## 2019-04-19 NOTE — TELEPHONE ENCOUNTER
Left detailed message for pt      ----- Message from Elena Heller MD sent at 4/19/2019  9:03 AM EDT -----  Regarding: RE: pt requesting call back  Contact: [phone]  If he has pain where the shingles is, this is not unusual. May have pain for weeks to sometimes months. If lightheaded and dizziness does not go away, please let us know. This may actually be related to Valtrex that he was given by Trudy. If stroke like symptoms, CP or SOB, needs to be seen. Would check his BP as well to make sure that that is okay.     ----- Message -----  From: Jessica Sutherland MA  Sent: 4/19/2019   8:18 AM  To: Elena Heller MD  Subject: FW: pt requesting call back                          ----- Message -----  From: Erum Painter  Sent: 4/18/2019   4:17 PM  To: [facility] [place]  Subject: pt requesting call back                          DON    Patient was seen on 4/17 in office for shingles.    Says last night he started experiencing pain on right side with lightheaded and dizziness.    Patient asking if he should be concerned.?

## 2019-05-07 RX ORDER — LIDOCAINE 50 MG/G
1 PATCH TOPICAL 2 TIMES DAILY
Qty: 30 EACH | Refills: 0 | Status: SHIPPED | OUTPATIENT
Start: 2019-05-07 | End: 2019-05-16 | Stop reason: SDUPTHER

## 2019-05-08 ENCOUNTER — TELEPHONE (OUTPATIENT)
Dept: INTERNAL MEDICINE | Facility: CLINIC | Age: 54
End: 2019-05-08

## 2019-05-08 NOTE — TELEPHONE ENCOUNTER
Lidocaine patches called in, pt informed.     ----- Message from YOHANNES Green sent at 5/7/2019  2:24 PM EDT -----  Regarding: RE: MED REQUEST  Contact: [phone]  Lidocaine patch  Sig: apply 1 patch q12hrs  Disp #30    Schedule a FU appt to look at shingles.   ----- Message -----  From: Hoa Young MA  Sent: 5/7/2019   2:23 PM  To: YOHANNES Green  Subject: FW: MED REQUEST                                  Send back to me please    ----- Message -----  From: Yenny Cain MA  Sent: 5/7/2019   1:54 PM  To: Hoa Young MA  Subject: FW: MED REQUEST                                  Would you ask Shayla exactly what she would like to do? Is it ok to put in a order and place in pending for lidocaine patch and what would you like to do with the anti viral medication that made him so dizzy does he need to finish the medication or can another one be sent in ?    ----- Message -----  From: Akhil Olivier  Sent: 5/7/2019   1:31 PM  To: [facility]  Subject: MED REQUEST                                      SHAYLA PT    Patient called to say that his shingles rash is almost gone, but the pain is worse. Is this normal? He said that when he was here, shayla mentioned a patch for pain, and he did not want it then. Now he is requesting it.  He also said that she gave him meds for antiviral for 7 days. He said that he only took it for 3 days because it made him so dizzy.    ching kim    Thanks!  akhil

## 2019-05-16 ENCOUNTER — OFFICE VISIT (OUTPATIENT)
Dept: INTERNAL MEDICINE | Facility: CLINIC | Age: 54
End: 2019-05-16

## 2019-05-16 VITALS
HEART RATE: 72 BPM | DIASTOLIC BLOOD PRESSURE: 70 MMHG | BODY MASS INDEX: 28.06 KG/M2 | RESPIRATION RATE: 16 BRPM | SYSTOLIC BLOOD PRESSURE: 120 MMHG | TEMPERATURE: 97.9 F | OXYGEN SATURATION: 99 % | HEIGHT: 70 IN | WEIGHT: 196 LBS

## 2019-05-16 DIAGNOSIS — B02.29 POSTHERPETIC NEURALGIA: Primary | ICD-10-CM

## 2019-05-16 PROCEDURE — 99213 OFFICE O/P EST LOW 20 MIN: CPT | Performed by: NURSE PRACTITIONER

## 2019-05-16 RX ORDER — LIDOCAINE 50 MG/G
2 PATCH TOPICAL EVERY 24 HOURS
Qty: 30 EACH | Refills: 0 | Status: SHIPPED | OUTPATIENT
Start: 2019-05-16 | End: 2020-07-29

## 2019-05-16 NOTE — PROGRESS NOTES
Chief Complaint   Patient presents with   • Follow-up     Pt took 2 days of Valtrex; Rash is gone    • Herpes Zoster       Subjective     Asad Jones is a 54 y.o. male being seen for a follow up appointment today regarding  shingles. He was in the office 4-, and prescribed Valtrex 1gr TID for Shingles. He stopped it 4- after experience some dizziness from the Valtrex. He called office again 5-8-2019 for continued pain from Shingles, he was prescribed a Lidocaine patch q12hrs for pain. Pain is a 3-4 of 10. It has improved 75% over the past week.       History of Present Illness     Allergies   Allergen Reactions   • Naproxen Angioedema         Current Outpatient Medications:   •  aspirin 81 MG EC tablet, Take 81 mg by mouth daily., Disp: , Rfl:   •  lidocaine (LIDODERM) 5 %, Place 1 patch on the skin as directed by provider 2 (Two) Times a Day. Remove & Discard patch within 12 hours or as directed by MD, Disp: 30 each, Rfl: 0  •  Multiple Vitamin (MULTI-VITAMIN DAILY PO), Take 1 tablet by mouth Daily., Disp: , Rfl:   •  Red Yeast Rice 600 MG tablet, Take 1 tablet by mouth daily., Disp: 30 tablet, Rfl: 0  •  valACYclovir (VALTREX) 1000 MG tablet, Take 1 tablet by mouth 3 (Three) Times a Day., Disp: 21 tablet, Rfl: 0    The following portions of the patient's history were reviewed and updated as appropriate: allergies, current medications, past family history, past medical history, past social history, past surgical history and problem list.    Review of Systems   Constitutional: Negative.    HENT: Negative.    Eyes: Negative.    Respiratory: Negative.    Cardiovascular: Negative.    Gastrointestinal: Negative.    Endocrine: Negative.    Genitourinary: Negative.    Musculoskeletal: Positive for back pain.   Neurological: Negative.    Hematological: Negative.    Psychiatric/Behavioral: Negative.        Assessment     Physical Exam   Constitutional: He is oriented to person, place, and time. He  appears well-developed and well-nourished.   HENT:   Head: Normocephalic.   Cardiovascular: Normal rate and regular rhythm.   No murmur heard.  Pulmonary/Chest: Effort normal and breath sounds normal. No stridor. No respiratory distress.   Musculoskeletal: He exhibits no edema.   Neurological: He is alert and oriented to person, place, and time.   Skin: Skin is warm.   Right groin with erythematous rash around flank, 90% resolved.    Psychiatric: He has a normal mood and affect. His behavior is normal.   Vitals reviewed.      Plan        Diagnosis Plan   1. Postherpetic neuralgia  lidocaine (LIDODERM) 5 %       PHN discussed.     He will get shingirx at the pharmacy in 11 weeks.

## 2019-05-16 NOTE — PATIENT INSTRUCTIONS
Postherpetic Neuralgia  Postherpetic neuralgia (PHN) is nerve pain that occurs after a shingles infection. Shingles is a painful rash that appears on one area of the body, usually on the trunk or face. Shingles is caused by the varicella-zoster virus. This is the same virus that causes chickenpox. In people who have had chickenpox, the virus can resurface years later and cause shingles.  You may have PHN if you continue to have pain for 4 months after your shingles rash has gone away. PHN appears in the same area where you had the shingles rash. The pain usually goes away after the rash disappears.  Getting a vaccination for shingles can prevent PHN. This vaccine is recommended for people older than 60. It may prevent shingles, and may also lower your risk of PHN if you do get shingles.  What are the causes?  This condition is caused by damage to your nerves from the varicella-zoster virus. The damage makes your nerves overly sensitive.  What increases the risk?  The following factors may make you more likely to develop this condition:  · Being older than 60 years of age.  · Having severe pain before your shingles rash starts.  · Having a severe rash.  · Having shingles in and around the eye area.  · Having a disease that makes your body unable to fight infections (weak immune system).    What are the signs or symptoms?  The main symptom of this condition is pain. The pain may:  · Often be very bad and may be described as stabbing, burning, or feeling like an electric shock.  · Come and go or may be there all the time.  · Be triggered by light touches on the skin or changes in temperature.    You may have itching along with the pain.  How is this diagnosed?  This condition may be diagnosed based on your symptoms and your history of shingles. Lab studies and other diagnostic tests are usually not needed.  How is this treated?  There is no cure for this condition. Treatment for PHN will focus on pain relief.  Over-the-counter pain relievers do not usually relieve PHN pain. You may need to work with a pain specialist. Treatment may include:  · Antidepressant medicines to help with pain and improve sleep.  · Anti-seizure medicines to relieve nerve pain.  · Strong pain relievers (opioids).  · A numbing patch worn on the skin (lidocaine patch).  · Botox (botulinum toxin) injections to block pain signals between nerves and muscles.  · Injections of numbing medicine or anti-inflammatory medicines around irritated nerves.    Follow these instructions at home:  · It may take a long time to recover from PHN. Work closely with your health care provider and develop a good support system at home.  · Take over-the-counter and prescription medicines only as told by your health care provider.  · Do not drive or use heavy machinery while taking prescription pain medicine.  · Wear loose, comfortable clothing.  · Cover sensitive areas with a dressing to reduce friction from clothing rubbing on the area.  · If directed, put ice on the painful area:  ? Put ice in a plastic bag.  ? Place a towel between your skin and the bag.  ? Leave the ice on for 20 minutes, 2-3 times a day.  · Talk to your health care provider if you feel depressed or desperate. Living with long-term pain can be depressing.  · Keep all follow-up visits as told by your health care provider. This is important.  Contact a health care provider if:  · Your medicine is not helping.  · You are struggling to manage your pain at home.  Summary  · Postherpetic neuralgia is a very painful disorder that can occur after an episode of shingles.  · The pain is often severe, burning, electric, or stabbing.  · Prescription medicines can be helpful in managing persistent pain.  · Getting a vaccination for shingles can prevent PHN. This vaccine is recommended for people older than 60.  This information is not intended to replace advice given to you by your health care provider. Make sure  you discuss any questions you have with your health care provider.  Document Released: 03/09/2004 Document Revised: 03/06/2018 Document Reviewed: 03/06/2018  [employer] Interactive Patient Education © 2019 [employer]  Live Zoster (Shingles) Vaccine, ZVL: What You Need to Know  1. What is shingles?  Shingles (also called herpes zoster, or just zoster) is a painful skin rash, often with blisters. Shingles is caused by the varicella zoster virus, the same virus that causes chickenpox. After you have chickenpox, the virus stays in your body and can cause shingles later in life.  You can't catch shingles from another person. However, a person who has never had chickenpox (or chickenpox vaccine) could get chickenpox from someone with shingles.  A shingles rash usually appears on one side of the face or body and heals within 2 to 4 weeks. Its main symptom is pain, which can be severe. Other symptoms can include fever, headache, chills, and upset stomach. Very rarely, a shingles infection can lead to pneumonia, hearing problems, blindness, brain inflammation (encephalitis), or death.  For about 1 person in 5, severe pain can continue even long after the rash has cleared up. This long-lasting pain is called post-herpetic neuralgia (PHN).  Shingles is far more common in people 50 years of age and older than in younger people, and the risk increases with age. It is also more common in people whose immune system is weakened because of a disease such as cancer or by drugs such as steroids or chemotherapy.  At least 1 million people a year in the [place] get shingles.  2. Shingles vaccine (live)  A live shingles vaccine was approved by FDA in 2006. In a clinical trial, the vaccine reduced the risk of shingles by about 50% in people 60 and older. It can reduce the likelihood of PHN, and reduce pain in some people who still get shingles after being vaccinated.  The recommended schedule for live shingles vaccine is a single  dose for adults 60 years of age and older.  3. Some people should not get this vaccine  Tell your vaccine provider if you:  · Have any severe, life-threatening allergies. A person who has ever had a life-threatening allergic reaction after a dose of live shingles vaccine, or has a severe allergy to any component of this vaccine, may be advised not to be vaccinated. Ask your health care provider if you want information about vaccine components.  · Are pregnant, or think you might be pregnant. Pregnant women should wait to get live shingles vaccine until they are no longer pregnant. Women should avoid getting pregnant for at least 1 month after getting shingles vaccine.  · Have a weakened immune system due to disease (such as cancer or AIDS) or medical treatments (such as radiation, immunotherapy, high-dose steroids, or chemotherapy).  · Are not feeling well. If you have a mild illness, such as a cold, you can probably get the vaccine today. If you are moderately or severely ill, you should probably wait until you recover. Your doctor can advise you.    4. Risks of a vaccine reaction  With any medicine, including vaccines, there is a chance of reactions.  After live shingles vaccination, a person might experience:  · Redness, soreness, swelling, or itching at the site of the injection  · Headache    These events are usually mild and go away on their own.  Rarely, live shingles vaccine can cause rash or shingles.  Other things that could happen after this vaccine:  · People sometimes faint after medical procedures, including vaccination. Sitting or lying down for about 15 minutes can help prevent fainting and injuries caused by a fall. Tell your provider if you feel dizzy or have vision changes or ringing in the ears.  · Some people get shoulder pain that can be more severe and longer-lasting than routine soreness that can follow injections. This happens very rarely.  · Any medication can cause a severe allergic  reaction. Such reactions to a vaccine are estimated at about 1 in a million doses, and would happen within a few minutes to a few hours after the vaccination.  As with any medicine, there is a very remote chance of a vaccine causing a serious injury or death.  The safety of vaccines is always being monitored. For more information, visit: www.cdc.gov/vaccinesafety/  5. What if there is a serious problem?  What should I look for?  · Look for anything that concerns you, such as signs of a severe allergic reaction, very high fever, or unusual behavior.  Signs of a severe allergic reaction can include hives, swelling of the face and throat, difficulty breathing, a fast heartbeat, dizziness, and weakness. These would usually start a few minutes to a few hours after the vaccination.  What should I do?  · If you think it is a severe allergic reaction or other emergency that can't wait, call 9-1-1 and get to the nearest hospital. Otherwise, call your health care provider.  · Afterward, the reaction should be reported to the Vaccine Adverse Event Reporting System (VAERS). Your doctor should file this report, or you can do it yourself through the VAERS web site at www.vaers.[facility].gov or by calling 1-280.354.7248.  VAERS does not give medical advice.  6. How can I learn more?  · Ask your healthcare provider. He or she can give you the vaccine package insert or suggest other sources of information.  · Call your local or state health department.  · Contact the Centers for Disease Control and Prevention (CDC):  ? Call 1-260.647.4052(8-505-EBV-INFO) or  ? Visit CDC's website at www.cdc.gov/vaccines  CDC Vaccine Information Statement (VIS) Live Zoster Vaccine (02/12/2018)  This information is not intended to replace advice given to you by your health care provider. Make sure you discuss any questions you have with your health care provider.  Document Released: 10/15/2007 Document Revised: 02/27/2018 Document Reviewed:  02/27/2018  [employer] Interactive Patient Education © 2019 [employer]

## 2020-07-27 ENCOUNTER — TELEPHONE (OUTPATIENT)
Dept: INTERNAL MEDICINE | Facility: CLINIC | Age: 55
End: 2020-07-27

## 2020-07-28 ENCOUNTER — TELEPHONE (OUTPATIENT)
Dept: INTERNAL MEDICINE | Facility: CLINIC | Age: 55
End: 2020-07-28

## 2020-07-28 NOTE — TELEPHONE ENCOUNTER
PATIENT IS CONCERNED ABOUT HIS BLOOD PRESSURE. HE CALLED THE ON CALL DOCTOR AND REPORTED A BLOOD PRESSURE 140/88.  PATIENT STATES THAT HE IS FEELING BETTER TODAY.  PATIENT WAS TRYING TO SCHEDULE AND APPOINTMENT TODAY.  HE IS CONCERNED WITH DIFFERENCE BLOOD PRESSURES IN HIS ARMS:  PATIENT HAS BEEN MONITORING LEFT/RIGHT ARM:  133/84 IN LEFT   129/87 IN RIGHT.  PLEASE ADVISE    PATIENT CALL BACK NUMBER: [phone]

## 2020-07-29 ENCOUNTER — OFFICE VISIT (OUTPATIENT)
Dept: INTERNAL MEDICINE | Facility: CLINIC | Age: 55
End: 2020-07-29

## 2020-07-29 VITALS
OXYGEN SATURATION: 99 % | TEMPERATURE: 98 F | RESPIRATION RATE: 16 BRPM | WEIGHT: 200.4 LBS | HEIGHT: 71 IN | BODY MASS INDEX: 28.06 KG/M2

## 2020-07-29 DIAGNOSIS — I10 ELEVATED BLOOD PRESSURE READING IN OFFICE WITH DIAGNOSIS OF HYPERTENSION: ICD-10-CM

## 2020-07-29 DIAGNOSIS — R42 VERTIGO: Primary | ICD-10-CM

## 2020-07-29 DIAGNOSIS — E78.2 MIXED HYPERLIPIDEMIA: ICD-10-CM

## 2020-07-29 DIAGNOSIS — H61.23 EXCESSIVE CERUMEN IN BOTH EAR CANALS: ICD-10-CM

## 2020-07-29 PROCEDURE — 99214 OFFICE O/P EST MOD 30 MIN: CPT | Performed by: NURSE PRACTITIONER

## 2020-07-29 PROCEDURE — 93000 ELECTROCARDIOGRAM COMPLETE: CPT | Performed by: NURSE PRACTITIONER

## 2020-07-29 PROCEDURE — 69210 REMOVE IMPACTED EAR WAX UNI: CPT | Performed by: NURSE PRACTITIONER

## 2020-07-29 RX ORDER — MECLIZINE HYDROCHLORIDE 25 MG/1
25 TABLET ORAL 3 TIMES DAILY PRN
Qty: 10 TABLET | Refills: 0 | Status: SHIPPED | OUTPATIENT
Start: 2020-07-29 | End: 2022-09-16

## 2020-07-29 NOTE — PATIENT INSTRUCTIONS
Vertigo  Vertigo is the feeling that you or your surroundings are moving when they are not. This feeling can come and go at any time. Vertigo often goes away on its own. Vertigo can be dangerous if it occurs while you are doing something that could endanger you or others, such as driving or operating machinery.  Your health care provider will do tests to determine the cause of your vertigo. Tests will also help your health care provider decide how best to treat your condition.  Follow these instructions at home:  Eating and drinking         · Drink enough fluid to keep your urine pale yellow.  · Do not drink alcohol.  Activity  · Return to your normal activities as told by your health care provider. Ask your health care provider what activities are safe for you.  · In the morning, first sit up on the side of the bed. When you feel okay, stand slowly while you hold onto something until you know that your balance is fine.  · Move slowly. Avoid sudden body or head movements or certain positions, as told by your health care provider.  · If you have trouble walking or keeping your balance, try using a cane for stability. If you feel dizzy or unstable, sit down right away.  · Avoid doing any tasks that would cause danger to you or others if vertigo occurs.  · Avoid bending down if you feel dizzy. Place items in your home so that they are easy for you to reach without leaning over.  · Do not drive or use heavy machinery if you feel dizzy.  General instructions  · Take over-the-counter and prescription medicines only as told by your health care provider.  · Keep all follow-up visits as told by your health care provider. This is important.  Contact a health care provider if:  · Your medicines do not relieve your vertigo or they make it worse.  · You have a fever.  · Your condition gets worse or you develop new symptoms.  · Your family or friends notice any behavioral changes.  · Your nausea or vomiting gets worse.  · You  have numbness or a prickling and tingling sensation in part of your body.  Get help right away if you:  · Have difficulty moving or speaking.  · Are always dizzy.  · Faint.  · Develop severe headaches.  · Have weakness in your hands, arms, or legs.  · Have changes in your hearing or vision.  · Develop a stiff neck.  · Develop sensitivity to light.  Summary  · Vertigo is the feeling that you or your surroundings are moving when they are not.  · Your health care provider will do tests to determine the cause of your vertigo.  · Follow instructions for home care. You may be told to avoid certain tasks, positions, or movements.  · Contact a health care provider if your medicines do not relieve your symptoms, or if you have a fever, nausea, vomiting, or changes in behavior.  · Get help right away if you have severe headaches or difficulty speaking, or you develop hearing or vision problems.  This information is not intended to replace advice given to you by your health care provider. Make sure you discuss any questions you have with your health care provider.  Document Released: 09/27/2006 Document Revised: 11/11/2019 Document Reviewed: 11/11/2019  [employer] Patient Education © 2020 [employer]

## 2020-07-29 NOTE — PROGRESS NOTES
"Chief Complaint   Patient presents with   • Dizziness     unstable on feet since saturday       Subjective     Asad Jones is a 55 y.o. male being seen for an acute visit for dizziness since Saturday. He was playing golf in the heat, and at the end of the end day he became light headed and \"woozy.\" He describes it as \"feeling off balance\"  This is worse with walking and positional changes. He reports that he can shake his head and be fine. Denies slurred speech, numbness in eith er side, unilateral weakness. He is reporting a pressure headache in right head behind eye one night, but resolved. He leonela sinus pressure, ear pain, sore throat.  He has been keeping a BP log and it is running 130s/80s for the most part. He denies C, SOA.       History of Present Illness     Allergies   Allergen Reactions   • Naproxen Angioedema   • Valtrex [Valacyclovir] Dizziness         Current Outpatient Medications:   •  aspirin 81 MG EC tablet, Take 81 mg by mouth daily., Disp: , Rfl:   •  Multiple Vitamin (MULTI-VITAMIN DAILY PO), Take 1 tablet by mouth Daily., Disp: , Rfl:   •  Red Yeast Rice 600 MG tablet, Take 1 tablet by mouth daily., Disp: 30 tablet, Rfl: 0  •  valACYclovir (VALTREX) 1000 MG tablet, Take 1 tablet by mouth 3 (Three) Times a Day., Disp: 21 tablet, Rfl: 0    The following portions of the patient's history were reviewed and updated as appropriate: allergies, current medications, past family history, past medical history, past social history, past surgical history and problem list.    Review of Systems   Constitutional: Negative for fever and unexpected weight change.   Eyes: Negative.  Negative for photophobia and visual disturbance.   Respiratory: Negative.  Negative for shortness of breath, wheezing and stridor.    Cardiovascular: Negative.  Negative for chest pain, palpitations and leg swelling.   Gastrointestinal: Negative.    Endocrine: Negative for polyphagia and polyuria.   Musculoskeletal: Negative.  " Negative for neck pain and neck stiffness.   Skin: Negative.    Allergic/Immunologic: Positive for environmental allergies.   Neurological: Positive for dizziness and light-headedness.   Hematological: Negative.  Negative for adenopathy. Does not bruise/bleed easily.   Psychiatric/Behavioral: Negative.  Negative for agitation, sleep disturbance and suicidal ideas.       Assessment     Physical Exam   Constitutional: He is oriented to person, place, and time. He appears well-developed and well-nourished. No distress.   HENT:   Head: Normocephalic.   Right Ear: A foreign body (wax) is present.   Left Ear: A foreign body (wax) is present.   Cardiovascular: Normal rate, regular rhythm and normal heart sounds.   Pulmonary/Chest: Effort normal and breath sounds normal. No stridor. No respiratory distress.   Abdominal: Soft.   Musculoskeletal: He exhibits no edema.   Neurological: He is alert and oriented to person, place, and time.   Skin: Skin is warm and dry.   Psychiatric: He has a normal mood and affect. His behavior is normal.   Vitals reviewed.      Chris Kamara was seen today for dizziness.    Diagnoses and all orders for this visit:    Vertigo  -     ECG 12 Lead  -     Comprehensive metabolic panel  -     Conv Lipid Panel w/ Chol/HDL Ratio  -     CBC & Differential  -     TSH  -     meclizine (ANTIVERT) 25 MG tablet; Take 1 tablet by mouth 3 (Three) Times a Day As Needed for Dizziness.    Excessive cerumen in both ear canals  -     Ear Cerumen Removal    Elevated blood pressure reading in office with diagnosis of hypertension  -     ECG 12 Lead  -     Comprehensive metabolic panel  -     Conv Lipid Panel w/ Chol/HDL Ratio  -     CBC & Differential  -     TSH    Mixed hyperlipidemia  -     Comprehensive metabolic panel  -     Conv Lipid Panel w/ Chol/HDL Ratio        Wax cerumen impaction removed due to vertigo, with no relief of symptoms.     Due to elevated BP readings at home and twin brother with history  of MI, ECG completed in office. No changes seen on ECG.     Baseline labs completed    Follow up in 2 weeks

## 2020-07-29 NOTE — PROGRESS NOTES
Procedure   Ear Cerumen Removal  Date/Time: 7/29/2020 10:13 AM  Performed by: Trudy Kruger APRN  Authorized by: Trudy Kruger APRN   Consent: Verbal consent obtained.  Risks and benefits: risks, benefits and alternatives were discussed  Consent given by: patient  Patient understanding: patient states understanding of the procedure being performed  Patient consent: the patient's understanding of the procedure matches consent given  Procedure consent: procedure consent matches procedure scheduled    Anesthesia:  Local Anesthetic: none  Location details: left ear and right ear  Procedure type: instrumentation and irrigation   Sedation:  Patient sedated: no

## 2020-07-29 NOTE — PROGRESS NOTES
Procedure     ECG 12 Lead  Date/Time: 7/29/2020 10:13 AM  Performed by: Trudy Kruger APRN  Authorized by: Trudy Kruger APRN   Comparison: compared with previous ECG from 2/5/2018  Similar to previous ECG  Rhythm: sinus rhythm  Ectopy comments: none  Rate: normal  BPM: 64  Conduction: conduction normal  Conduction comments: DC 0.20 QSR 0.12  ST Segments: ST segments normal  T Waves: T waves normal  QRS axis: normal  Other: no other findings    Clinical impression: normal ECG

## 2020-07-30 LAB
ALBUMIN SERPL-MCNC: 4.6 G/DL (ref 3.5–5.2)
ALBUMIN/GLOB SERPL: 2.2 G/DL
ALP SERPL-CCNC: 70 U/L (ref 39–117)
ALT SERPL-CCNC: 17 U/L (ref 1–41)
AST SERPL-CCNC: 17 U/L (ref 1–40)
BASOPHILS # BLD AUTO: 0.04 10*3/MM3 (ref 0–0.2)
BASOPHILS NFR BLD AUTO: 0.7 % (ref 0–1.5)
BILIRUB SERPL-MCNC: 0.6 MG/DL (ref 0–1.2)
BUN SERPL-MCNC: 17 MG/DL (ref 6–20)
BUN/CREAT SERPL: 17.2 (ref 7–25)
CALCIUM SERPL-MCNC: 9 MG/DL (ref 8.6–10.5)
CHLORIDE SERPL-SCNC: 104 MMOL/L (ref 98–107)
CHOLEST SERPL-MCNC: 184 MG/DL (ref 0–200)
CHOLEST/HDLC SERPL: 3.07 {RATIO}
CO2 SERPL-SCNC: 24.4 MMOL/L (ref 22–29)
CREAT SERPL-MCNC: 0.99 MG/DL (ref 0.76–1.27)
EOSINOPHIL # BLD AUTO: 0.08 10*3/MM3 (ref 0–0.4)
EOSINOPHIL NFR BLD AUTO: 1.5 % (ref 0.3–6.2)
ERYTHROCYTE [DISTWIDTH] IN BLOOD BY AUTOMATED COUNT: 12.3 % (ref 12.3–15.4)
GLOBULIN SER CALC-MCNC: 2.1 GM/DL
GLUCOSE SERPL-MCNC: 91 MG/DL (ref 65–99)
HCT VFR BLD AUTO: 47 % (ref 37.5–51)
HDLC SERPL-MCNC: 60 MG/DL (ref 40–60)
HGB BLD-MCNC: 15.6 G/DL (ref 13–17.7)
IMM GRANULOCYTES # BLD AUTO: 0.02 10*3/MM3 (ref 0–0.05)
IMM GRANULOCYTES NFR BLD AUTO: 0.4 % (ref 0–0.5)
LDLC SERPL CALC-MCNC: 108 MG/DL (ref 0–100)
LYMPHOCYTES # BLD AUTO: 1.78 10*3/MM3 (ref 0.7–3.1)
LYMPHOCYTES NFR BLD AUTO: 33.1 % (ref 19.6–45.3)
MCH RBC QN AUTO: 29.4 PG (ref 26.6–33)
MCHC RBC AUTO-ENTMCNC: 33.2 G/DL (ref 31.5–35.7)
MCV RBC AUTO: 88.5 FL (ref 79–97)
MONOCYTES # BLD AUTO: 0.48 10*3/MM3 (ref 0.1–0.9)
MONOCYTES NFR BLD AUTO: 8.9 % (ref 5–12)
NEUTROPHILS # BLD AUTO: 2.97 10*3/MM3 (ref 1.7–7)
NEUTROPHILS NFR BLD AUTO: 55.4 % (ref 42.7–76)
NRBC BLD AUTO-RTO: 0 /100 WBC (ref 0–0.2)
PLATELET # BLD AUTO: 183 10*3/MM3 (ref 140–450)
POTASSIUM SERPL-SCNC: 4.6 MMOL/L (ref 3.5–5.2)
PROT SERPL-MCNC: 6.7 G/DL (ref 6–8.5)
RBC # BLD AUTO: 5.31 10*6/MM3 (ref 4.14–5.8)
SODIUM SERPL-SCNC: 139 MMOL/L (ref 136–145)
TRIGL SERPL-MCNC: 82 MG/DL (ref 0–150)
TSH SERPL DL<=0.005 MIU/L-ACNC: 0.88 UIU/ML (ref 0.27–4.2)
VLDLC SERPL CALC-MCNC: 16.4 MG/DL
WBC # BLD AUTO: 5.37 10*3/MM3 (ref 3.4–10.8)

## 2020-08-05 ENCOUNTER — TELEPHONE (OUTPATIENT)
Dept: INTERNAL MEDICINE | Facility: CLINIC | Age: 55
End: 2020-08-05

## 2020-08-05 RX ORDER — DOXYCYCLINE 100 MG/1
100 CAPSULE ORAL 2 TIMES DAILY
Qty: 20 CAPSULE | Refills: 0 | Status: SHIPPED | OUTPATIENT
Start: 2020-08-05 | End: 2020-08-11

## 2020-08-05 NOTE — TELEPHONE ENCOUNTER
I called patient regarding lab results.     He is still having dizziness and now has a metal taste in his mouth that wont go away.    Please advise

## 2020-08-05 NOTE — TELEPHONE ENCOUNTER
Please log his Blood pressures.    I want him to try the following medication which covers for Lyme disease and possible inner ear issues:    Doxy 100 mg  Si po q12hr for 10 days  Disp #20

## 2020-08-11 ENCOUNTER — OFFICE VISIT (OUTPATIENT)
Dept: INTERNAL MEDICINE | Facility: CLINIC | Age: 55
End: 2020-08-11

## 2020-08-11 VITALS
RESPIRATION RATE: 16 BRPM | OXYGEN SATURATION: 98 % | DIASTOLIC BLOOD PRESSURE: 88 MMHG | WEIGHT: 200 LBS | HEART RATE: 55 BPM | TEMPERATURE: 97.9 F | SYSTOLIC BLOOD PRESSURE: 110 MMHG | HEIGHT: 71 IN | BODY MASS INDEX: 28 KG/M2

## 2020-08-11 DIAGNOSIS — H81.13 VERTIGO, BENIGN PAROXYSMAL, BILATERAL: Primary | ICD-10-CM

## 2020-08-11 PROCEDURE — 99213 OFFICE O/P EST LOW 20 MIN: CPT | Performed by: NURSE PRACTITIONER

## 2020-08-11 RX ORDER — FLUTICASONE PROPIONATE 50 MCG
2 SPRAY, SUSPENSION (ML) NASAL DAILY
Qty: 1 BOTTLE | Refills: 0 | Status: SHIPPED | OUTPATIENT
Start: 2020-08-11 | End: 2022-09-16

## 2020-08-11 RX ORDER — CETIRIZINE HYDROCHLORIDE 10 MG/1
10 TABLET ORAL DAILY
Qty: 30 TABLET | Refills: 0 | Status: SHIPPED | OUTPATIENT
Start: 2020-08-11 | End: 2022-09-16

## 2020-08-11 NOTE — PROGRESS NOTES
"follw up on dizziness    Subjective     Asad Jones is a 55 y.o. male being seen for a follow up appointment today regarding dizziness. He was in the office 2 weeks ago after developing dizziness while playing golf in the heat. His twin brother has CAD so he was screened with  an ECG. Ear wax was removed from both ears. His baseline CBC, CMP,TSH and lipid were unremarkable. He had an MRI 3-9-2018 due to FH aneurysm that was negative. He was given a script for Doxy 100mg BID, which he started 8-5-2020. He is describing as \"my head is 20 pounds heavier\". He reports that \"I ca shake my head and it is fine (does not worsen)\" He is walking to the left side and feeling like he is unsteady on his feet. He is unable to focus, has fatigue. No headaches, no visual changes.  HIs BP checks have been normal at home.       History of Present Illness     Allergies   Allergen Reactions   • Naproxen Angioedema   • Valtrex [Valacyclovir] Dizziness         Current Outpatient Medications:   •  aspirin 81 MG EC tablet, Take 81 mg by mouth daily., Disp: , Rfl:   •  doxycycline (MONODOX) 100 MG capsule, Take 1 capsule by mouth 2 (Two) Times a Day., Disp: 20 capsule, Rfl: 0  •  meclizine (ANTIVERT) 25 MG tablet, Take 1 tablet by mouth 3 (Three) Times a Day As Needed for Dizziness., Disp: 10 tablet, Rfl: 0  •  Multiple Vitamin (MULTI-VITAMIN DAILY PO), Take 1 tablet by mouth Daily., Disp: , Rfl:   •  Red Yeast Rice 600 MG tablet, Take 1 tablet by mouth daily., Disp: 30 tablet, Rfl: 0  •  valACYclovir (VALTREX) 1000 MG tablet, Take 1 tablet by mouth 3 (Three) Times a Day., Disp: 21 tablet, Rfl: 0    The following portions of the patient's history were reviewed and updated as appropriate: allergies, current medications, past family history, past medical history, past social history, past surgical history and problem list.    Review of Systems   Constitutional: Negative.    HENT: Positive for congestion.    Eyes: Negative.  Negative for " photophobia and visual disturbance.   Respiratory: Negative.  Negative for cough, wheezing and stridor.    Cardiovascular: Negative.  Negative for chest pain, palpitations and leg swelling.   Gastrointestinal: Negative.    Endocrine: Negative.    Genitourinary: Negative.    Musculoskeletal: Negative.    Neurological: Positive for dizziness.   Hematological: Negative.  Negative for adenopathy. Does not bruise/bleed easily.   Psychiatric/Behavioral: Negative.    All other systems reviewed and are negative.      Assessment     Physical Exam   Constitutional: He is oriented to person, place, and time. He appears well-developed and well-nourished. No distress.   HENT:   Head: Normocephalic.   Right Ear: There is drainage (wax).   Left Ear: Hearing normal. No drainage or swelling. No decreased hearing is noted.   Nose: No mucosal edema or rhinorrhea.   Cardiovascular: Normal rate, regular rhythm and normal heart sounds.   No murmur heard.  Pulmonary/Chest: Effort normal and breath sounds normal. No stridor. No respiratory distress.   Neurological: He is alert and oriented to person, place, and time. He has normal strength and normal reflexes. No cranial nerve deficit or sensory deficit. Coordination and gait normal.   Skin: Skin is warm and dry.   Psychiatric: He has a normal mood and affect. His behavior is normal.   Vitals reviewed.      Plan     His fasting labs were reviewed with the patient from last week.     Asad was seen today for dizziness.    Diagnoses and all orders for this visit:    Vertigo, benign paroxysmal, bilateral  -     cetirizine (zyrTEC) 10 MG tablet; Take 1 tablet by mouth Daily.  -     fluticasone (Flonase) 50 MCG/ACT nasal spray; 2 sprays into the nostril(s) as directed by provider Daily.  -     Ambulatory Referral to ENT (Otolaryngology)    BP log is normal. No neuro deficits.     Will treat allergies for 2 weeks, if no improvement will see ENT.

## 2020-08-11 NOTE — PATIENT INSTRUCTIONS
Benign Positional Vertigo  Vertigo is the feeling that you or your surroundings are moving when they are not. Benign positional vertigo is the most common form of vertigo. This is usually a harmless condition (benign). This condition is positional. This means that symptoms are triggered by certain movements and positions.  This condition can be dangerous if it occurs while you are doing something that could cause harm to you or others. This includes activities such as driving or operating machinery.  What are the causes?  In many cases, the cause of this condition is not known. It may be caused by a disturbance in an area of the inner ear that helps your brain to sense movement and balance. This disturbance can be caused by:  · Viral infection (labyrinthitis).  · Head injury.  · Repetitive motion, such as jumping, dancing, or running.  What increases the risk?  You are more likely to develop this condition if:  · You are a woman.  · You are 50 years of age or older.  What are the signs or symptoms?  Symptoms of this condition usually happen when you move your head or your eyes in different directions. Symptoms may start suddenly, and usually last for less than a minute. They include:  · Loss of balance and falling.  · Feeling like you are spinning or moving.  · Feeling like your surroundings are spinning or moving.  · Nausea and vomiting.  · Blurred vision.  · Dizziness.  · Involuntary eye movement (nystagmus).  Symptoms can be mild and cause only minor problems, or they can be severe and interfere with daily life. Episodes of benign positional vertigo may return (recur) over time. Symptoms may improve over time.  How is this diagnosed?  This condition may be diagnosed based on:  · Your medical history.  · Physical exam of the head, neck, and ears.  · Tests, such as:  ? MRI.  ? CT scan.  ? Eye movement tests. Your health care provider may ask you to change positions quickly while he or she watches you for symptoms  of benign positional vertigo, such as nystagmus. Eye movement may be tested with a variety of exams that are designed to evaluate or stimulate vertigo.  ? An electroencephalogram (EEG). This records electrical activity in your brain.  ? Hearing tests.  You may be referred to a health care provider who specializes in ear, nose, and throat (ENT) problems (otolaryngologist) or a provider who specializes in disorders of the nervous system (neurologist).  How is this treated?    This condition may be treated in a session in which your health care provider moves your head in specific positions to adjust your inner ear back to normal. Treatment for this condition may take several sessions. Surgery may be needed in severe cases, but this is rare.   In some cases, benign positional vertigo may resolve on its own in 2-4 weeks.  Follow these instructions at home:  Safety  · Move slowly. Avoid sudden body or head movements or certain positions, as told by your health care provider.  · Avoid driving until your health care provider says it is safe for you to do so.  · Avoid operating heavy machinery until your health care provider says it is safe for you to do so.  · Avoid doing any tasks that would be dangerous to you or others if vertigo occurs.  · If you have trouble walking or keeping your balance, try using a cane for stability. If you feel dizzy or unstable, sit down right away.  · Return to your normal activities as told by your health care provider. Ask your health care provider what activities are safe for you.  General instructions  · Take over-the-counter and prescription medicines only as told by your health care provider.  · Drink enough fluid to keep your urine pale yellow.  · Keep all follow-up visits as told by your health care provider. This is important.  Contact a health care provider if:  · You have a fever.  · Your condition gets worse or you develop new symptoms.  · Your family or friends notice any  "behavioral changes.  · You have nausea or vomiting that gets worse.  · You have numbness or a \"pins and needles\" sensation.  Get help right away if you:  · Have difficulty speaking or moving.  · Are always dizzy.  · Faint.  · Develop severe headaches.  · Have weakness in your legs or arms.  · Have changes in your hearing or vision.  · Develop a stiff neck.  · Develop sensitivity to light.  Summary  · Vertigo is the feeling that you or your surroundings are moving when they are not. Benign positional vertigo is the most common form of vertigo.  · The cause of this condition is not known. It may be caused by a disturbance in an area of the inner ear that helps your brain to sense movement and balance.  · Symptoms include loss of balance and falling, feeling that you or your surroundings are moving, nausea and vomiting, and blurred vision.  · This condition can be diagnosed based on symptoms, physical exam, and other tests, such as MRI, CT scan, eye movement tests, and hearing tests.  · Follow safety instructions as told by your health care provider. You will also be told when to contact your health care provider in case of problems.  This information is not intended to replace advice given to you by your health care provider. Make sure you discuss any questions you have with your health care provider.  Document Released: 09/25/2007 Document Revised: 05/29/2019 Document Reviewed: 05/29/2019  Elsevier Patient Education © 2020 [employer]    "

## 2021-02-22 ENCOUNTER — TELEPHONE (OUTPATIENT)
Dept: INTERNAL MEDICINE | Facility: CLINIC | Age: 56
End: 2021-02-22

## 2021-02-22 ENCOUNTER — OFFICE VISIT (OUTPATIENT)
Dept: INTERNAL MEDICINE | Facility: CLINIC | Age: 56
End: 2021-02-22

## 2021-02-22 VITALS
BODY MASS INDEX: 29.96 KG/M2 | WEIGHT: 214 LBS | HEART RATE: 78 BPM | SYSTOLIC BLOOD PRESSURE: 126 MMHG | OXYGEN SATURATION: 98 % | DIASTOLIC BLOOD PRESSURE: 88 MMHG | TEMPERATURE: 97.6 F | RESPIRATION RATE: 16 BRPM | HEIGHT: 71 IN

## 2021-02-22 DIAGNOSIS — G89.29 CHRONIC LEFT SHOULDER PAIN: Primary | ICD-10-CM

## 2021-02-22 DIAGNOSIS — M25.512 CHRONIC LEFT SHOULDER PAIN: Primary | ICD-10-CM

## 2021-02-22 PROBLEM — H81.13 VERTIGO, BENIGN PAROXYSMAL, BILATERAL: Status: RESOLVED | Noted: 2020-08-11 | Resolved: 2021-02-22

## 2021-02-22 PROBLEM — Z12.5 SCREENING PSA (PROSTATE SPECIFIC ANTIGEN): Status: RESOLVED | Noted: 2018-02-05 | Resolved: 2021-02-22

## 2021-02-22 PROBLEM — R42 VERTIGO: Status: RESOLVED | Noted: 2020-07-29 | Resolved: 2021-02-22

## 2021-02-22 PROBLEM — Z12.11 COLON CANCER SCREENING: Status: RESOLVED | Noted: 2018-03-01 | Resolved: 2021-02-22

## 2021-02-22 PROCEDURE — 99213 OFFICE O/P EST LOW 20 MIN: CPT | Performed by: NURSE PRACTITIONER

## 2021-02-22 RX ORDER — DICLOFENAC SODIUM 30 MG/G
GEL TOPICAL 4 TIMES DAILY
Qty: 100 G | Refills: 1 | Status: SHIPPED | OUTPATIENT
Start: 2021-02-22 | End: 2022-09-16

## 2021-02-22 NOTE — TELEPHONE ENCOUNTER
Patient states he got a call from [facility] that states his insurance will not cover the Diclofenac Sodium (VOLTAREN) 3 % gel gel. He either needs a prior authorization filled out or a different script sent in.     Please call patient at [phone].

## 2021-02-22 NOTE — PROGRESS NOTES
Chief Complaint   Patient presents with   • Shoulder Pain     pt has been having pain for 6 mo in left shouder        Subjective     Asad Jones is a 55 y.o. male being seen for a follow up appointment today regarding left shoulder pain. Described as a dull to sharp pain at night with sharp pain with lifting > 90 degrees. Pain 4 of 10. No known injury. Associated decreased strength in left hand. 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 He limited activity to try to rest it.   He is reporting left shoulder pain for 6 months. He tried Tylenol and Ibuprofen with no relief.  He was referred to ortho, but did not followed up there.       History of Present Illness     Allergies   Allergen Reactions   • Naproxen Angioedema   • Valtrex [Valacyclovir] Dizziness         Current Outpatient Medications:   •  aspirin 81 MG EC tablet, Take 81 mg by mouth daily., Disp: , Rfl:   •  cetirizine (zyrTEC) 10 MG tablet, Take 1 tablet by mouth Daily., Disp: 30 tablet, Rfl: 0  •  Multiple Vitamin (MULTI-VITAMIN DAILY PO), Take 1 tablet by mouth Daily., Disp: , Rfl:   •  Red Yeast Rice 600 MG tablet, Take 1 tablet by mouth daily., Disp: 30 tablet, Rfl: 0  •  fluticasone (Flonase) 50 MCG/ACT nasal spray, 2 sprays into the nostril(s) as directed by provider Daily., Disp: 1 bottle, Rfl: 0  •  meclizine (ANTIVERT) 25 MG tablet, Take 1 tablet by mouth 3 (Three) Times a Day As Needed for Dizziness., Disp: 10 tablet, Rfl: 0    The following portions of the patient's history were reviewed and updated as appropriate: allergies, current medications, past family history, past medical history, past social history, past surgical history and problem list.    Review of Systems   Constitutional: Negative.    HENT: Negative.    Eyes: Negative.    Cardiovascular: Negative.  Negative for chest pain, palpitations and leg swelling.   Endocrine: Negative.    Musculoskeletal: Positive for arthralgias.   Allergic/Immunologic: Negative.     Neurological: Negative.    Hematological: Negative.    Psychiatric/Behavioral: Negative.    All other systems reviewed and are negative.      Assessment     Physical Exam  Vitals signs reviewed.   Constitutional:       Appearance: Normal appearance. He is not ill-appearing.   Cardiovascular:      Rate and Rhythm: Normal rate and regular rhythm.      Pulses: Normal pulses.      Heart sounds: Normal heart sounds. No murmur.   Pulmonary:      Effort: Pulmonary effort is normal.   Musculoskeletal:      Left shoulder: He exhibits tenderness, pain and decreased strength. He exhibits normal range of motion.      Comments: Left shoulder with pain to anterior shoulder. Painful arc, pain with internal and external rotation. Strength in left hand 5/5. Decreased shoulder strength 3/5.    Neurological:      General: No focal deficit present.      Mental Status: He is alert and oriented to person, place, and time.   Psychiatric:         Mood and Affect: Mood normal.         Behavior: Behavior normal.         Thought Content: Thought content normal.         Plan       Diagnoses and all orders for this visit:    1. Chronic left shoulder pain (Primary)  -     Diclofenac Sodium (VOLTAREN) 3 % gel gel; Apply  topically to the appropriate area as directed 4 (Four) Times a Day. Apply to left shoulder  Dispense: 100 g; Refill: 1  -     Ambulatory Referral to Physical Therapy Evaluate and treat        No XR needed, possible rotator cuff tendonitis.     Refer to PT.

## 2021-02-25 ENCOUNTER — HOSPITAL ENCOUNTER (OUTPATIENT)
Dept: PHYSICAL THERAPY | Facility: HOSPITAL | Age: 56
Setting detail: THERAPIES SERIES
Discharge: HOME | End: 2021-02-25

## 2021-02-25 DIAGNOSIS — G89.29 CHRONIC LEFT SHOULDER PAIN: Primary | ICD-10-CM

## 2021-02-25 DIAGNOSIS — M25.512 CHRONIC LEFT SHOULDER PAIN: Primary | ICD-10-CM

## 2021-02-25 PROCEDURE — 97161 PT EVAL LOW COMPLEX 20 MIN: CPT | Performed by: PHYSICAL THERAPIST

## 2021-02-25 NOTE — THERAPY EVALUATION
Outpatient Physical Therapy Ortho Initial Evaluation   Noelle Marie     Patient Name: Asad Jones  : 1965  MRN: [id number]  Today's Date: 2021      Visit Date: 2021    Patient Active Problem List   Diagnosis   • Cough   • Right shoulder pain   • Allergic rhinitis   • Health care maintenance   • S/P cervical spinal fusion   • Hyperlipidemia   • Primary snoring   • Adenomatous polyp of sigmoid colon   • Herpes zoster without complication   • Postherpetic neuralgia   • Excessive cerumen in both ear canals   • Elevated blood pressure reading in office with diagnosis of hypertension        Past Medical History:   Diagnosis Date   • Colon polyp    • Hyperlipidemia    • Kidney stone         Past Surgical History:   Procedure Laterality Date   • COLONOSCOPY     • COLONOSCOPY W/ POLYPECTOMY N/A 3/30/2018    Procedure: COLONOSCOPY,  polypectomy;  Surgeon: Danita Springer MD;  Location: [facility];  Service: Gastroenterology   • NECK SURGERY         Visit Dx:     ICD-10-CM ICD-9-CM   1. Chronic left shoulder pain  M25.512 719.41    G89.29 338.29         Patient History     Row Name 21 0700             History    Chief Complaint  Pain;Difficulty with daily activities  -SP      Type of Pain  Shoulder pain  -SP      Brief Description of Current Complaint  Patient reports chronic history of bilateral shoulder pain for approximately 1 year.  Patient states that his family MD recommended ortho MD but patient did not go.  He states that approximately 3 months ago right shoulder started hurting more at night and shortly after left shoulder began doing the same.   Patient reports that currently he has constant pain in left shoulder.  Patient states that he was playing golf about 4 months ago, he has stopped playing.  Patient is unsure of any activity that would have caused symptoms.  He does notice weakness with attempts to lift overhead. Pain is present in anterior aspect of shoulder and occasional pain  in scapula area.   Patient has had no testing    -SP      Previous treatment for THIS PROBLEM  Medication topical  -SP      Patient/Caregiver Goals  Relieve pain;Improve strength  -SP      Patient's Rating of General Health  Very good  -SP      Hand Dominance  right-handed  -SP      Occupation/sports/leisure activities  ; coaches volleyball  -SP      How has patient tried to help current problem?  OTC meds ibuprofen; topical cream  -SP         Pain     Pain Location  Shoulder  -SP      Pain at Present  3  -SP      Pain at Best  3  -SP      Pain at Worst  9  -SP      Pain Frequency  Constant/continuous  -SP      Pain Description  Sharp  -SP      What Performance Factors Make the Current Problem(s) WORSE?  abduction, elevation especially overhead; lifting even at waist level  -SP      What Performance Factors Make the Current Problem(s) BETTER?  resting with arm to side  -SP      Tolerance Time- Standing  unlimited  -SP      Tolerance Time- Sitting  unlimited  -SP      Tolerance Time- Walking  unlimited  -SP      Is your sleep disturbed?  Yes  -SP      What position do you sleep in?  Right sidelying;Left sidelying  -SP      Difficulties at work?  limited use of left UE for work  -SP      Difficulties with ADL's?  difficulty dressing especially reaching behind  -SP         Fall Risk Assessment    Any falls in the past year:  No  -SP         Daily Activities    Primary Language  English  -SP      How does patient learn best?  Listening;Reading;Demonstration;Pictures/Video  -SP      Teaching needs identified  Home Exercise Program;Management of Condition  -SP      Barriers to learning  None  -SP      Pt Participated in POC and Goals  Yes  -SP         Safety    Are you being hurt, hit, or frightened by anyone at home or in your life?  No  -SP      Are you being neglected by a caregiver  No  -SP        User Key  (r) = Recorded By, (t) = Taken By, (c) = Cosigned By    Initials Name Provider Type    SP  Elena Reid, PT Physical Therapist          PT Ortho     Row Name 02/25/21 0900       Posture/Observations    Forward Head  Mild  -SP    Cervical Lordosis  Decreased  -SP    Rounded Shoulders  Bilateral:;Moderate  -SP       Shoulder Impingement/Rotator Cuff Special Tests    Gant-Bradford Test (RC Lesion vs. Bursitis)  Left:;Positive  -SP    Neer Impingement Test (RC Lesion vs. Bursitis)  Left:;Positive  -SP    Full Can Test (RC Lesion)  Left:;Negative  -SP    Empty Can Test (RC Lesion)  Left:;Negative mild pain  -SP       Shoulder Laxity/Instability Special Tests    Horizontal Adduction Test (AC Joint Pain)  Positive;Left:  -SP       Shoulder Girdle Palpation    Subacromial Space  Left:;Tender  -SP    Supraspinatus Insertion  Left:;Tender  -SP    AC Joint  Left:;Tender  -SP    Pect Minor  Left:;Tender;Guarded/taut  -SP       Left Upper Ext    Lt Shoulder Abduction AROM  143 degrees with mild discomfort at end range  -SP    Lt Shoulder Abduction PROM  173 degrees with pain at end range  -SP    Lt Shoulder Extension AROM  WFL  -SP    Lt Shoulder Flexion AROM  145 degrees with pain at end range  -SP    Lt Shoulder Flexion PROM  164 degrees with pain at end range  -SP    Lt Shoulder External Rotation AROM  able to reach to back of head without difficulty  -SP    Lt Shoulder External Rotation PROM  90 degrees  -SP    Lt Shoulder Internal Rotation AROM  able to reach to L!  -SP    Lt Shoulder Internal Rotation PROM  54 degrees  -SP    Lt Elbow Extension/Flexion AROM  WFL  -SP       MMT Left Upper Ext    Lt Shoulder Flexion MMT, Gross Movement  (4-/5) good minus  -SP    Lt Shoulder Extension MMT, Gross Movement  (5/5) normal  -SP    Lt Shoulder ABduction MMT, Gross Movement  (4-/5) good minus  -SP    Lt Shoulder Internal Rotation MMT, Gross Movement  (4/5) good  -SP    Lt Shoulder External Rotation MMT, Gross Movement  (4-/5) good minus  -SP    Lt Scapular ADduction MMT, Gross Movement  (3/5) fair  -SP    Lt  Scapular ADduction & Depression MMT, Gross Movement  (3/5) fair  -SP    Lt Elbow Flexion MMT, Gross Movement  (4+/5) good plus  -SP    Lt Elbow Extension MMT, Gross Movement  (4+/5) good plus  -SP       Sensation    Sensation WNL?  WFL  -SP       Upper Extremity Flexibility    Upper Trapezius  Bilateral:;Moderately limited  -SP    Pect Minor  Bilateral:;Moderately limited  -SP       Transfers    Bed-Chair [place] (Transfers)  independent  -SP    Chair-Bed [place] (Transfers)  independent  -SP    Sit-Stand [place] (Transfers)  independent  -SP    Stand-Sit [place] (Transfers)  independent  -SP       Gait/Stairs (Locomotion)    [place] Level (Gait)  independent  -SP      User Key  (r) = Recorded By, (t) = Taken By, (c) = Cosigned By    Initials Name Provider Type    Elena Peters, PT Physical Therapist                      Therapy Education  Given: HEP  Program: New  How Provided: Verbal, Written  Provided to: Patient  Level of Understanding: Verbalized, Demonstrated     PT OP Goals     Row Name 02/25/21 0900          PT Short Term Goals    STG Date to Achieve  03/12/21  -SP     STG 1  Patient demonstrates left shoulder AROM to WFL without complaints of pain at end ranges  -SP     STG 2  Patient reports that he is able to sleep through the night without waking due to shoulder pain  -SP     STG 3  Patient able to place and retrieve 5 lb object from overhead shelf without increased pain or difficulty  -SP        Long Term Goals    LTG Date to Achieve  03/27/21  -SP     LTG 1  Patient demonstrates increased strength left UE to >4/5 throughout  -SP     LTG 2  Patient able to complete home and work duties with pain level 0-1/10   -SP     LTG 3  Patient independent with HEP  -SP        Time Calculation    PT Goal Re-Cert Due Date  03/27/21  -SP       User Key  (r) = Recorded By, (t) = Taken By, (c) = Cosigned By    Initials Name Provider Type    Elena Peters, PT Physical  Therapist          PT Assessment/Plan     Row Name 02/25/21 0959          PT Assessment    Functional Limitations  Limitation in home management;Limitations in community activities;Performance in work activities;Performance in sport activities;Performance in self-care ADL;Performance in leisure activities  -SP     Assessment Comments  Patient with chronic history of bilateral shoulder pain.  Left shoulder pain has significantly worsened over the last few months.  Patient presents with pain, antalgic and limited left shoulder mobility, decreased strength, poor shoulder girdle alignment and difficulty performing home. work and community ADLs using left UE  -SP     Please refer to paper survey for additional self-reported information  Yes  -SP     Rehab Potential  Good  -SP     Patient/caregiver participated in establishment of treatment plan and goals  Yes  -SP     Patient would benefit from skilled therapy intervention  Yes  -SP        PT Plan    PT Frequency  2x/week;1x/week  -SP     Predicted Duration of Therapy Intervention (PT)  4 weeks  -SP       User Key  (r) = Recorded By, (t) = Taken By, (c) = Cosigned By    Initials Name Provider Type    Elena Peters, PT Physical Therapist            OP Exercises     Row Name 02/25/21 0900             Exercise 1    Exercise Name 1  sleeper stretch  -SP      Cueing 1  Verbal;Demo  -SP      Reps 1  4  -SP      Time 1  15 sec  -SP         Exercise 2    Exercise Name 2  sidelying shoulder ER  -SP      Cueing 2  Verbal;Demo  -SP      Reps 2  30  -SP      Time 2  1#  -SP         Exercise 3    Exercise Name 3  tband rows  -SP      Cueing 3  Verbal;Demo  -SP      Reps 3  30  -SP      Time 3  blue tband  -SP         Exercise 4    Exercise Name 4  tband shoulder IR   -SP      Cueing 4  Verbal;Demo  -SP      Reps 4  30  -SP      Time 4  blue tband  -SP         Exercise 5    Exercise Name 5  tband shoulder ER  -SP      Cueing 5  Verbal;Demo  -SP      Reps 5  30  -SP       Time 5  green tband  -SP        User Key  (r) = Recorded By, (t) = Taken By, (c) = Cosigned By    Initials Name Provider Type    Elena Peters, PT Physical Therapist           Manual Rx (last 36 hours)      Manual Treatments     Row Name 02/25/21 0900             Manual Rx 1    Manual Rx 1 Location  left shoulder  -SP      Manual Rx 1 Type  posterior capsule stretch/mobilization with passive shoulder IR  -SP      Manual Rx 1 Duration  5 min  -SP        User Key  (r) = Recorded By, (t) = Taken By, (c) = Cosigned By    Initials Name Provider Type    Elena Peters, PT Physical Therapist                      Outcome Measure Options: Quick DASH  Quick DASH  Open a tight or new jar.: No Difficulty  Do heavy household chores (e.g., wash walls, wash floors): No Difficulty  Carry a shopping bag or briefcase: No Difficulty  Wash your back: No Difficulty  Use a knife to cut food: No Difficulty  Recreational activities in which you take some force or impact through your arm, should or hand (e.g. golf, hammering, tennis, etc.): Moderate Difficulty  During the past week, to what extent has your arm, shoulder, or hand problem interfered with your normal social activites with family, friends, neighbors or groups?: Quite a bit  During the past week, were you limited in your work or other regular daily activities as a result of your arm, shoulder or hand problem?: Slightly Limited  Arm, Shoulder, or hand pain: Severe  Tingling (pins and needles) in your arm, shoulder, or hand: None  During the past week, how much difficulty have you had sleeping because of the pain in your arm, shoulder or hand?: Severe Difficulty  Number of Questions Answered: 11  Quick DASH Score: 27.27  Work Module (Optional)  Using your usual technique for your work?: No Difficulty  Doing your usual work because of arm, shoulder or hand pain?: No Difficulty  Doing your work as well as you would like?: No Difficulty  Spending your usual  amount of time doing your work?: No Difficulty  Work Module Score: 0  Sports/Performing Arts Module (Optional)  Using your usual technique for playing your instrument or sport?: Severe Difficulty  Playing your musical instrument or sport because of arm, shoulder or hand pain?: Severe Difficulty  Playing your musical instrument or sport as well as you would like?: Severe Difficulty  Spending your usual amount of time practising or playing your instrument or sport?: Severe Difficulty  Sports/Performing Arts Score: 75         Time Calculation:     Start Time: 0745  Stop Time: 0855  Time Calculation (min): 70 min     Therapy Charges for Today     Code Description Service Date Service Provider Modifiers Qty    [id number] HC PT EVAL LOW COMPLEXITY 3 2/25/2021 Elena Reid, PT GP 1          PT G-Codes  Outcome Measure Options: Quick DASH  Quick DASH Score: 27.27         Elena Reid, PT  2/25/2021

## 2021-03-04 ENCOUNTER — HOSPITAL ENCOUNTER (OUTPATIENT)
Dept: PHYSICAL THERAPY | Facility: HOSPITAL | Age: 56
Setting detail: THERAPIES SERIES
Discharge: HOME | End: 2021-03-04

## 2021-03-04 DIAGNOSIS — G89.29 CHRONIC LEFT SHOULDER PAIN: Primary | ICD-10-CM

## 2021-03-04 DIAGNOSIS — M25.512 CHRONIC LEFT SHOULDER PAIN: Primary | ICD-10-CM

## 2021-03-04 PROCEDURE — 97110 THERAPEUTIC EXERCISES: CPT | Performed by: PHYSICAL THERAPIST

## 2021-03-04 NOTE — THERAPY TREATMENT NOTE
Outpatient Physical Therapy Ortho Treatment Note   [facility]     Patient Name: Asad Jones  : 1965  MRN: [id number]  Today's Date: 3/4/2021      Visit Date: 2021    Visit Dx:    ICD-10-CM ICD-9-CM   1. Chronic left shoulder pain  M25.512 719.41    G89.29 338.29       Patient Active Problem List   Diagnosis   • Cough   • Right shoulder pain   • Allergic rhinitis   • Health care maintenance   • S/P cervical spinal fusion   • Hyperlipidemia   • Primary snoring   • Adenomatous polyp of sigmoid colon   • Herpes zoster without complication   • Postherpetic neuralgia   • Excessive cerumen in both ear canals   • Elevated blood pressure reading in office with diagnosis of hypertension        Past Medical History:   Diagnosis Date   • Colon polyp    • Hyperlipidemia    • Kidney stone         Past Surgical History:   Procedure Laterality Date   • COLONOSCOPY     • COLONOSCOPY W/ POLYPECTOMY N/A 3/30/2018    Procedure: COLONOSCOPY,  polypectomy;  Surgeon: Danita Springer MD;  Location: [facility];  Service: Gastroenterology   • NECK SURGERY         PT Ortho     Row Name 21 0800       Subjective Comments    Subjective Comments  Patient reports that he has been consistent with exercises and feels he is improving. Patient reports that kinesiotape really helped his shoulder and he was able to sleep with less shoulder pain.  -SP      User Key  (r) = Recorded By, (t) = Taken By, (c) = Cosigned By    Initials Name Provider Type    Elena Peters, PT Physical Therapist                      PT Assessment/Plan     Row Name 21 8838          PT Assessment    Assessment Comments  Patient with good response to kinesiotape.   He demonstrates good compliance with HEP and tolerates progression of ther-ex well.  -SP        PT Plan    PT Plan Comments  Continue to progress as tolerable  -SP       User Key  (r) = Recorded By, (t) = Taken By, (c) = Cosigned By    Initials Name Provider Type    SP  Elena Reid, CONG Physical Therapist          Modalities     Row Name 03/04/21 0800             Moist Heat    MH Applied  Yes  -SP      Location  left shoulder; patient seated   -SP      Rx Minutes  10 mins  -SP      MH Prior to Rx  Yes  -SP        User Key  (r) = Recorded By, (t) = Taken By, (c) = Cosigned By    Initials Name Provider Type    Elena Peters, CONG Physical Therapist        OP Exercises     Row Name 03/04/21 0800             Subjective Comments    Subjective Comments  Patient reports that he has been consistent with exercises and feels he is improving. Patient reports that kinesiotape really helped his shoulder and he was able to sleep with less shoulder pain.  -SP         Exercise 1    Exercise Name 1  sleeper stretch  -SP      Cueing 1  Verbal;Demo  -SP      Reps 1  5  -SP      Time 1  15 sec  -SP         Exercise 2    Exercise Name 2  sidelying shoulder ER  -SP      Cueing 2  Verbal;Demo  -SP      Reps 2  30  -SP      Time 2  1#  -SP         Exercise 3    Exercise Name 3  tband rows  -SP      Cueing 3  Verbal;Demo  -SP      Reps 3  30  -SP      Time 3  blue tband  -SP         Exercise 4    Exercise Name 4  tband shoulder IR   -SP      Cueing 4  Verbal;Demo  -SP      Reps 4  30  -SP      Time 4  blue tband  -SP         Exercise 5    Exercise Name 5  tband shoulder ER  -SP      Cueing 5  Verbal;Demo  -SP      Reps 5  30  -SP      Time 5  blue tband  -SP         Exercise 6    Exercise Name 6  (B) shoulder extension with scapula retraction  -SP      Cueing 6  Verbal;Demo  -SP      Reps 6  20  -SP      Time 6  blue tband  -SP         Exercise 7    Exercise Name 7  prone y's, t's, i's  -SP      Cueing 7  Verbal;Demo  -SP      Sets 7  2  -SP      Reps 7  10  -SP      Additional Comments  1 lb  -SP        User Key  (r) = Recorded By, (t) = Taken By, (c) = Cosigned By    Initials Name Provider Type    Elena Peters, CONG Physical Therapist                      Manual Rx  (last 36 hours)      Manual Treatments     Row Name 03/04/21 0800             Manual Rx 1    Manual Rx 1 Location  left shoulder  -SP      Manual Rx 1 Type  posterior capsule stretch/mobilization with passive shoulder IR  -SP      Manual Rx 1 Duration  5 min  -SP        User Key  (r) = Recorded By, (t) = Taken By, (c) = Cosigned By    Initials Name Provider Type    Elena Peters, PT Physical Therapist              Therapy Education  Given: HEP  Program: Progressed  How Provided: Verbal, Written  Provided to: Patient  Level of Understanding: Verbalized, Demonstrated              Time Calculation:   Start Time: 0800  Stop Time: 0900  Time Calculation (min): 60 min  Therapy Charges for Today     Code Description Service Date Service Provider Modifiers Qty    78965548562 HC PT THER PROC EA 15 MIN 3/4/2021 Elena Reid, PT GP 2                    Elena Reid, PT  3/4/2021

## 2021-03-16 ENCOUNTER — HOSPITAL ENCOUNTER (OUTPATIENT)
Dept: PHYSICAL THERAPY | Facility: HOSPITAL | Age: 56
Setting detail: THERAPIES SERIES
Discharge: HOME | End: 2021-03-16

## 2021-03-16 DIAGNOSIS — M25.512 CHRONIC LEFT SHOULDER PAIN: Primary | ICD-10-CM

## 2021-03-16 DIAGNOSIS — G89.29 CHRONIC LEFT SHOULDER PAIN: Primary | ICD-10-CM

## 2021-03-16 PROCEDURE — 97110 THERAPEUTIC EXERCISES: CPT | Performed by: PHYSICAL THERAPIST

## 2021-03-16 NOTE — THERAPY TREATMENT NOTE
Outpatient Physical Therapy Ortho Treatment Note   [place]     Patient Name: Asad Jones  : 1965  MRN: [id number]  Today's Date: 3/16/2021      Visit Date: 2021    Visit Dx:    ICD-10-CM ICD-9-CM   1. Chronic left shoulder pain  M25.512 719.41    G89.29 338.29       Patient Active Problem List   Diagnosis   • Cough   • Right shoulder pain   • Allergic rhinitis   • Health care maintenance   • S/P cervical spinal fusion   • Hyperlipidemia   • Primary snoring   • Adenomatous polyp of sigmoid colon   • Herpes zoster without complication   • Postherpetic neuralgia   • Excessive cerumen in both ear canals   • Elevated blood pressure reading in office with diagnosis of hypertension        Past Medical History:   Diagnosis Date   • Colon polyp    • Hyperlipidemia    • Kidney stone         Past Surgical History:   Procedure Laterality Date   • COLONOSCOPY     • COLONOSCOPY W/ POLYPECTOMY N/A 3/30/2018    Procedure: COLONOSCOPY,  polypectomy;  Surgeon: Danita Springer MD;  Location: [facility];  Service: Gastroenterology   • NECK SURGERY         PT Ortho     Row Name 21 0800       Subjective Comments    Subjective Comments  Patient reports that he had a death in his family and has not been doing his shoulder exercises.  He reports that he has been more aware of his posture and trying to avoid forward shoulder posture.   -SP      User Key  (r) = Recorded By, (t) = Taken By, (c) = Cosigned By    Initials Name Provider Type    Elena Peters, PT Physical Therapist                      PT Assessment/Plan     Row Name 21 1969          PT Assessment    Assessment Comments  Patient tolerates progression of ther-ex well with some cues needed to avoid scapula elevation.  Patient is more attentive to posture.  -SP        PT Plan    PT Plan Comments  Patient to continue with HEP and follow up in one week.  -SP       User Key  (r) = Recorded By, (t) = Taken By, (c) = Cosigned By     "Initials Name Provider Type    Elena Peters, PT Physical Therapist          Modalities     Row Name 03/16/21 0800             Moist Heat    MH Applied  Yes  -SP      Location  left shoulder; patient seated   -SP      Rx Minutes  10 mins  -SP      MH Prior to Rx  Yes  -SP         Other Treatment Provided    Taping / Bracing  kinesiotape to left shoulder:  \"Y\" strip anchored at deltoid insert and pulled along anterior and posterior arm:  space correct over proximal shoulder  -SP        User Key  (r) = Recorded By, (t) = Taken By, (c) = Cosigned By    Initials Name Provider Type    Elena Peters, PT Physical Therapist        OP Exercises     Row Name 03/16/21 0800             Subjective Comments    Subjective Comments  Patient reports that he had a death in his family and has not been doing his shoulder exercises.  He reports that he has been more aware of his posture and trying to avoid forward shoulder posture.   -SP         Exercise 1    Exercise Name 1  sleeper stretch  -SP      Cueing 1  Verbal;Demo  -SP      Reps 1  5  -SP      Time 1  15 sec  -SP         Exercise 2    Exercise Name 2  sidelying shoulder ER  -SP      Cueing 2  Verbal;Demo  -SP      Reps 2  30  -SP      Time 2  1#  -SP         Exercise 3    Exercise Name 3  tband rows  -SP      Cueing 3  Verbal;Demo  -SP      Reps 3  30  -SP      Time 3  blue tband  -SP         Exercise 4    Exercise Name 4  tband shoulder IR   -SP      Cueing 4  Verbal;Demo  -SP      Reps 4  30  -SP      Time 4  blue tband  -SP         Exercise 5    Exercise Name 5  tband shoulder ER  -SP      Cueing 5  Verbal;Demo  -SP      Reps 5  30  -SP      Time 5  blue tband  -SP         Exercise 6    Exercise Name 6  (B) shoulder extension with scapula retraction  -SP      Cueing 6  Verbal;Demo  -SP      Reps 6  20  -SP      Time 6  blue tband  -SP         Exercise 7    Exercise Name 7  prone y's, t's, i's  -SP      Cueing 7  Verbal;Demo  -SP      Sets 7  2  " -SP      Reps 7  10  -SP      Additional Comments  1 lb  -SP         Exercise 8    Exercise Name 8  scaption thumbs up  -SP      Cueing 8  Verbal;Demo  -SP      Reps 8  20  -SP      Additional Comments  1 lb  -SP         Exercise 9    Exercise Name 9  scaption thumbs down  -SP      Cueing 9  Verbal;Demo  -SP      Reps 9  10  -SP      Additional Comments   1lb  -SP        User Key  (r) = Recorded By, (t) = Taken By, (c) = Cosigned By    Initials Name Provider Type    Elena Peters, CONG Physical Therapist                      Manual Rx (last 36 hours)      Manual Treatments     Row Name 03/16/21 0800             Manual Rx 1    Manual Rx 1 Location  left shoulder  -SP      Manual Rx 1 Type  posterior capsule stretch/mobilization with passive shoulder IR  -SP      Manual Rx 1 Duration  5 min  -SP        User Key  (r) = Recorded By, (t) = Taken By, (c) = Cosigned By    Initials Name Provider Type    Elena Peters, CONG Physical Therapist              Therapy Education  Given: HEP  Program: Reinforced  How Provided: Verbal  Provided to: Patient  Level of Understanding: Verbalized, Demonstrated              Time Calculation:   Start Time: 0806  Stop Time: 0910  Time Calculation (min): 64 min  Therapy Charges for Today     Code Description Service Date Service Provider Modifiers Qty    62405531476 HC PT THER PROC EA 15 MIN 3/16/2021 Elena Reid, PT GP 1                    Elena Reid, PT  3/16/2021

## 2021-03-25 ENCOUNTER — APPOINTMENT (OUTPATIENT)
Dept: PHYSICAL THERAPY | Facility: HOSPITAL | Age: 56
End: 2021-03-25

## 2021-03-31 ENCOUNTER — HOSPITAL ENCOUNTER (OUTPATIENT)
Dept: PHYSICAL THERAPY | Facility: HOSPITAL | Age: 56
Setting detail: THERAPIES SERIES
Discharge: HOME | End: 2021-03-31

## 2021-03-31 DIAGNOSIS — G89.29 CHRONIC LEFT SHOULDER PAIN: Primary | ICD-10-CM

## 2021-03-31 DIAGNOSIS — M25.512 CHRONIC LEFT SHOULDER PAIN: Primary | ICD-10-CM

## 2021-03-31 PROCEDURE — 97110 THERAPEUTIC EXERCISES: CPT | Performed by: PHYSICAL THERAPIST

## 2021-03-31 PROCEDURE — 97140 MANUAL THERAPY 1/> REGIONS: CPT | Performed by: PHYSICAL THERAPIST

## 2021-04-08 ENCOUNTER — HOSPITAL ENCOUNTER (OUTPATIENT)
Dept: PHYSICAL THERAPY | Facility: HOSPITAL | Age: 56
Setting detail: THERAPIES SERIES
Discharge: HOME | End: 2021-04-08

## 2021-04-08 DIAGNOSIS — M25.512 CHRONIC LEFT SHOULDER PAIN: Primary | ICD-10-CM

## 2021-04-08 DIAGNOSIS — G89.29 CHRONIC LEFT SHOULDER PAIN: Primary | ICD-10-CM

## 2021-04-08 PROCEDURE — 97110 THERAPEUTIC EXERCISES: CPT | Performed by: PHYSICAL THERAPIST

## 2021-04-08 NOTE — THERAPY TREATMENT NOTE
Outpatient Physical Therapy Ortho Treatment Note   Noelle Marie     Patient Name: Asad Jones  : 1965  MRN: [id number]  Today's Date: 2021      Visit Date: 2021    Visit Dx:    ICD-10-CM ICD-9-CM   1. Chronic left shoulder pain  M25.512 719.41    G89.29 338.29       Patient Active Problem List   Diagnosis   • Cough   • Right shoulder pain   • Allergic rhinitis   • Health care maintenance   • S/P cervical spinal fusion   • Hyperlipidemia   • Primary snoring   • Adenomatous polyp of sigmoid colon   • Herpes zoster without complication   • Postherpetic neuralgia   • Excessive cerumen in both ear canals   • Elevated blood pressure reading in office with diagnosis of hypertension        Past Medical History:   Diagnosis Date   • Colon polyp    • Hyperlipidemia    • Kidney stone         Past Surgical History:   Procedure Laterality Date   • COLONOSCOPY     • COLONOSCOPY W/ POLYPECTOMY N/A 3/30/2018    Procedure: COLONOSCOPY,  polypectomy;  Surgeon: Danita Springer MD;  Location: [facility];  Service: Gastroenterology   • NECK SURGERY         PT Ortho     Row Name 21 0800       Subjective Comments    Subjective Comments  Patient reports that he has had some increase in pain with recent activity including pickleball, volleyball and tennis.  Patient states that he has been doing his exercises and typically feels better after doing them.    -SP      User Key  (r) = Recorded By, (t) = Taken By, (c) = Cosigned By    Initials Name Provider Type    Elena Peters, PT Physical Therapist                      PT Assessment/Plan     Row Name 21 0905          PT Assessment    Assessment Comments  Patient has met or made progress toward all goals.  He exhibits good ROM left UE and improved strength.  Patient demonstrates good compliance with HEP.  -SP        PT Plan    PT Plan Comments  Patient to continue with HEP and follow up as needed.  -SP       User Key  (r) = Recorded By, (t) =  "Taken By, (c) = Cosigned By    Initials Name Provider Type    Elena Peters, PT Physical Therapist          Modalities     Row Name 04/08/21 0800             Moist Heat    MH Applied  Yes  -SP      Location  left shoulder; patient seated   -SP      Rx Minutes  10 mins  -SP      MH Prior to Rx  Yes  -SP         Other Treatment Provided    Taping / Bracing  kinesiotape to left shoulder:  \"Y\" strip anchored at deltoid insert and pulled along anterior and posterior arm:  space correct over proximal shoulder  -SP        User Key  (r) = Recorded By, (t) = Taken By, (c) = Cosigned By    Initials Name Provider Type    Elena Peters, PT Physical Therapist        OP Exercises     Row Name 04/08/21 0800             Subjective Comments    Subjective Comments  Patient reports that he has had some increase in pain with recent activity including pickleball, volleyball and tennis.  Patient states that he has been doing his exercises and typically feels better after doing them.    -SP         Exercise 1    Exercise Name 1  sleeper stretch  -SP      Cueing 1  Verbal;Demo  -SP      Reps 1  5  -SP      Time 1  15 sec  -SP         Exercise 2    Exercise Name 2  sidelying shoulder ER  -SP      Cueing 2  Verbal;Demo  -SP      Reps 2  30  -SP      Time 2  2#  -SP         Exercise 3    Exercise Name 3  tband rows  -SP      Cueing 3  Verbal;Demo  -SP      Reps 3  30  -SP      Time 3  black tband  -SP         Exercise 4    Exercise Name 4  tband shoulder IR   -SP      Cueing 4  Verbal;Demo  -SP      Reps 4  30  -SP      Time 4  black tband  -SP         Exercise 5    Exercise Name 5  tband shoulder ER  -SP      Cueing 5  Verbal;Demo  -SP      Reps 5  30  -SP      Time 5  black tband  -SP         Exercise 6    Exercise Name 6  (B) shoulder extension with scapula retraction  -SP      Cueing 6  Verbal;Demo  -SP      Reps 6  20  -SP      Time 6  black tband  -SP         Exercise 7    Exercise Name 7  prone y's, t's, i's "  -SP      Cueing 7  Verbal;Demo  -SP      Sets 7  3  -SP      Reps 7  10  -SP      Additional Comments  2 lb  -SP         Exercise 8    Exercise Name 8  scaption thumbs up  -SP      Cueing 8  Verbal;Demo  -SP      Reps 8  20  -SP      Additional Comments  1 lb  -SP         Exercise 9    Exercise Name 9  scaption thumbs down  -SP      Cueing 9  Verbal;Demo  -SP      Reps 9  15  -SP      Additional Comments  1lb  -SP        User Key  (r) = Recorded By, (t) = Taken By, (c) = Cosigned By    Initials Name Provider Type    Elena Peters, PT Physical Therapist                      Manual Rx (last 36 hours)      Manual Treatments     Row Name 04/08/21 0800             Manual Rx 1    Manual Rx 1 Location  left shoulder  -SP      Manual Rx 1 Type  posterior capsule stretch/mobilization with passive shoulder IR  -SP      Manual Rx 1 Duration  5 min  -SP        User Key  (r) = Recorded By, (t) = Taken By, (c) = Cosigned By    Initials Name Provider Type    Elena Peters, PT Physical Therapist          PT OP Goals     Row Name 04/08/21 0800          PT Short Term Goals    STG Date to Achieve  03/12/21  -SP     STG 1  Patient demonstrates left shoulder AROM to WFL without complaints of pain at end ranges  -SP     STG 1 Progress  Met  -SP     STG 2  Patient reports that he is able to sleep through the night without waking due to shoulder pain  -SP     STG 2 Progress  Met  -SP     STG 3  Patient able to place and retrieve 5 lb object from overhead shelf without increased pain or difficulty  -SP     STG 3 Progress  Met  -SP        Long Term Goals    LTG Date to Achieve  03/27/21  -SP     LTG 1  Patient demonstrates increased strength left UE to >4/5 throughout  -SP     LTG 1 Progress  Met  -SP     LTG 2  Patient able to complete home and work duties with pain level 0-1/10   -SP     LTG 2 Progress  Partially Met  -SP     LTG 3  Patient independent with HEP  -SP     LTG 3 Progress  Met  -SP       User Key   (r) = Recorded By, (t) = Taken By, (c) = Cosigned By    Initials Name Provider Type    SP Elena Reid, PT Physical Therapist          Therapy Education  Given: HEP  Program: Reinforced  How Provided: Verbal  Provided to: Patient  Level of Understanding: Verbalized, Demonstrated              Time Calculation:   Start Time: 0801  Stop Time: 0905  Time Calculation (min): 64 min  Therapy Charges for Today     Code Description Service Date Service Provider Modifiers Qty    64463121783 HC PT THER PROC EA 15 MIN 4/8/2021 Elena Ried, PT GP 2                    Elena Reid, PT  4/8/2021

## 2022-01-11 NOTE — ANESTHESIA PREPROCEDURE EVALUATION
Anesthesia Evaluation     Patient summary reviewed and Nursing notes reviewed   no history of anesthetic complications:  NPO Solid Status: > 8 hours  NPO Liquid Status: > 8 hours           Airway   Mallampati: II  TM distance: >3 FB  Neck ROM: full  No difficulty expected  Dental - normal exam     Pulmonary - negative pulmonary ROS and normal exam   Cardiovascular - normal exam  Exercise tolerance: good (4-7 METS)    ECG reviewed  Rhythm: regular  Rate: normal    (+) hyperlipidemia,       Neuro/Psych- negative ROS  GI/Hepatic/Renal/Endo - negative ROS     Musculoskeletal (-) negative ROS    Abdominal  - normal exam   Substance History   (+) alcohol use,      OB/GYN          Other - negative ROS                       Anesthesia Plan    ASA 2     MAC     Anesthetic plan and risks discussed with patient.  Use of blood products discussed with patient  Consented to blood products.      Surgery is rescheduled for next week

## 2022-09-16 ENCOUNTER — OFFICE VISIT (OUTPATIENT)
Dept: INTERNAL MEDICINE | Facility: CLINIC | Age: 57
End: 2022-09-16

## 2022-09-16 VITALS
HEART RATE: 89 BPM | SYSTOLIC BLOOD PRESSURE: 116 MMHG | HEIGHT: 70 IN | OXYGEN SATURATION: 99 % | TEMPERATURE: 97.7 F | BODY MASS INDEX: 27.77 KG/M2 | DIASTOLIC BLOOD PRESSURE: 84 MMHG | WEIGHT: 194 LBS

## 2022-09-16 DIAGNOSIS — K63.5 POLYP OF SIGMOID COLON, UNSPECIFIED TYPE: ICD-10-CM

## 2022-09-16 DIAGNOSIS — K57.92 DIVERTICULITIS: Primary | ICD-10-CM

## 2022-09-16 PROBLEM — K57.32 DIVERTICULITIS OF LARGE INTESTINE WITHOUT PERFORATION OR ABSCESS WITHOUT BLEEDING: Status: ACTIVE | Noted: 2022-09-16

## 2022-09-16 PROCEDURE — 99213 OFFICE O/P EST LOW 20 MIN: CPT | Performed by: NURSE PRACTITIONER

## 2022-09-16 RX ORDER — METRONIDAZOLE 500 MG/1
500 TABLET ORAL 3 TIMES DAILY
Qty: 21 TABLET | Refills: 0 | Status: SHIPPED | OUTPATIENT
Start: 2022-09-16

## 2022-09-16 RX ORDER — CIPROFLOXACIN 500 MG/1
500 TABLET, FILM COATED ORAL 2 TIMES DAILY
Qty: 10 TABLET | Refills: 0 | Status: SHIPPED | OUTPATIENT
Start: 2022-09-16

## 2022-09-16 NOTE — PROGRESS NOTES
Chief Complaint   Patient presents with   • Abdominal Pain     Below belly button       Subjective     Asad Jones is a 57 y.o. male being seen for an acute visit for LLQ for 3-4 days. Pain rated 2-5 of 10. Described as a constant, cramping pain. He reports that he has some pinpoint pain in LLQ. He denies constipation, blood in stool,. He has has bowel changes. Admission (Discharged) with Danita Springer MD (03/30/2018)        History of Present Illness     Allergies   Allergen Reactions   • Naproxen Angioedema   • Valtrex [Valacyclovir] Dizziness         Current Outpatient Medications:   •  Multiple Vitamin (MULTI-VITAMIN DAILY PO), Take 1 tablet by mouth Daily., Disp: , Rfl:   •  Red Yeast Rice 600 MG tablet, Take 1 tablet by mouth daily., Disp: 30 tablet, Rfl: 0    The following portions of the patient's history were reviewed and updated as appropriate: allergies, current medications, past family history, past medical history, past social history, past surgical history and problem list.    Review of Systems   Constitutional: Positive for appetite change.   HENT: Negative.    Respiratory: Negative.    Gastrointestinal: Positive for abdominal pain, constipation and diarrhea. Negative for anal bleeding, blood in stool, nausea and rectal pain.       Assessment     Physical Exam  Vitals reviewed.   Constitutional:       Appearance: Normal appearance.   Cardiovascular:      Rate and Rhythm: Normal rate and regular rhythm.      Pulses: Normal pulses.      Heart sounds: Normal heart sounds.   Pulmonary:      Effort: Pulmonary effort is normal.      Breath sounds: Normal breath sounds.   Abdominal:      General: There is no distension.      Palpations: Abdomen is soft. There is no mass.      Tenderness: There is abdominal tenderness in the left lower quadrant. There is no right CVA tenderness or left CVA tenderness.   Neurological:      Mental Status: He is alert.         Plan       Diagnoses and all orders for this  visit:    1. Diverticulitis (Primary)  -     ciprofloxacin (Cipro) 500 MG tablet; Take 1 tablet by mouth 2 (Two) Times a Day.  Dispense: 10 tablet; Refill: 0  -     metroNIDAZOLE (Flagyl) 500 MG tablet; Take 1 tablet by mouth 3 (Three) Times a Day.  Dispense: 21 tablet; Refill: 0    2. Polyp of sigmoid colon, unspecified type  -     Ambulatory Referral to Gastroenterology        Discussed diverticulitis, last colonoscopy, and CT abd/pelvis. Since s/s improving and no acute abd, will treat empirically.He will start a clear liquid for 24 hours and advance as tolerated After Cipro and Flagyl complete, get Benefiber to prevent issues,. Discusses s/s of diverticlar rupture.     Follow up if no resolution

## 2024-11-14 ENCOUNTER — HOSPITAL ENCOUNTER (EMERGENCY)
Facility: HOSPITAL | Age: 59
Discharge: HOME | End: 2024-11-14
Attending: STUDENT IN AN ORGANIZED HEALTH CARE EDUCATION/TRAINING PROGRAM
Payer: COMMERCIAL

## 2024-11-14 ENCOUNTER — APPOINTMENT (OUTPATIENT)
Dept: GENERAL RADIOLOGY | Facility: HOSPITAL | Age: 59
End: 2024-11-14
Payer: COMMERCIAL

## 2024-11-14 ENCOUNTER — APPOINTMENT (OUTPATIENT)
Dept: CT IMAGING | Facility: HOSPITAL | Age: 59
End: 2024-11-14
Payer: COMMERCIAL

## 2024-11-14 ENCOUNTER — TELEPHONE (OUTPATIENT)
Dept: INTERNAL MEDICINE | Facility: CLINIC | Age: 59
End: 2024-11-14
Payer: COMMERCIAL

## 2024-11-14 VITALS
HEIGHT: 70 IN | OXYGEN SATURATION: 95 % | TEMPERATURE: 98.2 F | BODY MASS INDEX: 28.86 KG/M2 | WEIGHT: 201.6 LBS | HEART RATE: 59 BPM | RESPIRATION RATE: 12 BRPM | DIASTOLIC BLOOD PRESSURE: 89 MMHG | SYSTOLIC BLOOD PRESSURE: 127 MMHG

## 2024-11-14 DIAGNOSIS — R07.9 CHEST PAIN, UNSPECIFIED TYPE: ICD-10-CM

## 2024-11-14 DIAGNOSIS — R29.898 RIGHT ARM WEAKNESS: ICD-10-CM

## 2024-11-14 DIAGNOSIS — R51.9 NONINTRACTABLE EPISODIC HEADACHE, UNSPECIFIED HEADACHE TYPE: ICD-10-CM

## 2024-11-14 DIAGNOSIS — I10 PRIMARY HYPERTENSION: Primary | ICD-10-CM

## 2024-11-14 LAB
ALBUMIN SERPL-MCNC: 4.2 G/DL (ref 3.5–5.2)
ALBUMIN/GLOB SERPL: 1.6 G/DL
ALP SERPL-CCNC: 72 U/L (ref 39–117)
ALT SERPL W P-5'-P-CCNC: 20 U/L (ref 1–41)
ANION GAP SERPL CALCULATED.3IONS-SCNC: 9.1 MMOL/L (ref 5–15)
AST SERPL-CCNC: 23 U/L (ref 1–40)
BASOPHILS # BLD AUTO: 0.04 10*3/MM3 (ref 0–0.2)
BASOPHILS NFR BLD AUTO: 0.8 % (ref 0–1.5)
BILIRUB SERPL-MCNC: 0.7 MG/DL (ref 0–1.2)
BUN SERPL-MCNC: 14 MG/DL (ref 6–20)
BUN/CREAT SERPL: 15.6 (ref 7–25)
CALCIUM SPEC-SCNC: 9.2 MG/DL (ref 8.6–10.5)
CHLORIDE SERPL-SCNC: 105 MMOL/L (ref 98–107)
CO2 SERPL-SCNC: 24.9 MMOL/L (ref 22–29)
CREAT SERPL-MCNC: 0.9 MG/DL (ref 0.76–1.27)
DEPRECATED RDW RBC AUTO: 39.2 FL (ref 37–54)
EGFRCR SERPLBLD CKD-EPI 2021: 98.4 ML/MIN/1.73
EOSINOPHIL # BLD AUTO: 0.07 10*3/MM3 (ref 0–0.4)
EOSINOPHIL NFR BLD AUTO: 1.4 % (ref 0.3–6.2)
ERYTHROCYTE [DISTWIDTH] IN BLOOD BY AUTOMATED COUNT: 12.3 % (ref 12.3–15.4)
GEN 5 2HR TROPONIN T REFLEX: 7 NG/L
GLOBULIN UR ELPH-MCNC: 2.6 GM/DL
GLUCOSE SERPL-MCNC: 105 MG/DL (ref 65–99)
HCT VFR BLD AUTO: 44 % (ref 37.5–51)
HGB BLD-MCNC: 15.1 G/DL (ref 13–17.7)
HOLD SPECIMEN: NORMAL
HOLD SPECIMEN: NORMAL
IMM GRANULOCYTES # BLD AUTO: 0.01 10*3/MM3 (ref 0–0.05)
IMM GRANULOCYTES NFR BLD AUTO: 0.2 % (ref 0–0.5)
LYMPHOCYTES # BLD AUTO: 1.65 10*3/MM3 (ref 0.7–3.1)
LYMPHOCYTES NFR BLD AUTO: 33.3 % (ref 19.6–45.3)
MCH RBC QN AUTO: 29.8 PG (ref 26.6–33)
MCHC RBC AUTO-ENTMCNC: 34.3 G/DL (ref 31.5–35.7)
MCV RBC AUTO: 87 FL (ref 79–97)
MONOCYTES # BLD AUTO: 0.57 10*3/MM3 (ref 0.1–0.9)
MONOCYTES NFR BLD AUTO: 11.5 % (ref 5–12)
NEUTROPHILS NFR BLD AUTO: 2.62 10*3/MM3 (ref 1.7–7)
NEUTROPHILS NFR BLD AUTO: 52.8 % (ref 42.7–76)
NRBC BLD AUTO-RTO: 0 /100 WBC (ref 0–0.2)
PLATELET # BLD AUTO: 179 10*3/MM3 (ref 140–450)
PMV BLD AUTO: 9.8 FL (ref 6–12)
POTASSIUM SERPL-SCNC: 4 MMOL/L (ref 3.5–5.2)
PROT SERPL-MCNC: 6.8 G/DL (ref 6–8.5)
RBC # BLD AUTO: 5.06 10*6/MM3 (ref 4.14–5.8)
SODIUM SERPL-SCNC: 139 MMOL/L (ref 136–145)
TROPONIN T DELTA: -1 NG/L
TROPONIN T SERPL HS-MCNC: 8 NG/L
WBC NRBC COR # BLD AUTO: 4.96 10*3/MM3 (ref 3.4–10.8)
WHOLE BLOOD HOLD COAG: NORMAL
WHOLE BLOOD HOLD SPECIMEN: NORMAL

## 2024-11-14 PROCEDURE — 84484 ASSAY OF TROPONIN QUANT: CPT | Performed by: NURSE PRACTITIONER

## 2024-11-14 PROCEDURE — 80053 COMPREHEN METABOLIC PANEL: CPT | Performed by: NURSE PRACTITIONER

## 2024-11-14 PROCEDURE — 93010 ELECTROCARDIOGRAM REPORT: CPT | Performed by: INTERNAL MEDICINE

## 2024-11-14 PROCEDURE — 99284 EMERGENCY DEPT VISIT MOD MDM: CPT | Performed by: STUDENT IN AN ORGANIZED HEALTH CARE EDUCATION/TRAINING PROGRAM

## 2024-11-14 PROCEDURE — 36415 COLL VENOUS BLD VENIPUNCTURE: CPT

## 2024-11-14 PROCEDURE — 72125 CT NECK SPINE W/O DYE: CPT

## 2024-11-14 PROCEDURE — 71045 X-RAY EXAM CHEST 1 VIEW: CPT

## 2024-11-14 PROCEDURE — 85025 COMPLETE CBC W/AUTO DIFF WBC: CPT | Performed by: NURSE PRACTITIONER

## 2024-11-14 PROCEDURE — 70450 CT HEAD/BRAIN W/O DYE: CPT

## 2024-11-14 PROCEDURE — 93005 ELECTROCARDIOGRAM TRACING: CPT | Performed by: NURSE PRACTITIONER

## 2024-11-14 RX ORDER — ASPIRIN 81 MG/1
162 TABLET, CHEWABLE ORAL DAILY
COMMUNITY

## 2024-11-14 RX ORDER — SODIUM CHLORIDE 0.9 % (FLUSH) 0.9 %
10 SYRINGE (ML) INJECTION AS NEEDED
Status: DISCONTINUED | OUTPATIENT
Start: 2024-11-14 | End: 2024-11-14 | Stop reason: HOSPADM

## 2024-11-14 RX ORDER — LISINOPRIL 10 MG/1
10 TABLET ORAL DAILY
Qty: 30 TABLET | Refills: 0 | Status: SHIPPED | OUTPATIENT
Start: 2024-11-14 | End: 2024-11-25 | Stop reason: SDUPTHER

## 2024-11-14 RX ORDER — LISINOPRIL 10 MG/1
20 TABLET ORAL ONCE
Status: COMPLETED | OUTPATIENT
Start: 2024-11-14 | End: 2024-11-14

## 2024-11-14 RX ADMIN — LISINOPRIL 20 MG: 10 TABLET ORAL at 15:04

## 2024-11-14 NOTE — ED PROVIDER NOTES
EMERGENCY DEPARTMENT ENCOUNTER      Room Number:     History is provided by the patient, no translation services needed    HPI:    Chief complaint: Central chest pressure with elevated blood pressure reading prior to arrival.,  Headache right temporal, right arm weakness times months    Quality/Severity: Patient reports moderate chest pressure that lasted about 10 minutes.    Timing/Duration: Lasted 10 minutes    Modifying Factors: Resolve spontaneously, did felt slightly off during this time    Associated Symptoms: Hypertension    Narrative: Pt is a 59 y.o. male who presents complaining of having a single episode of central substernal chest pressure prior to arrival today.  Patient denies any history of chest pain in the past.  He states he initially thought it may have been GERD as he does have GERD intermittently however, he reports he has never had this sensation before.  He does endorse feeling slightly off during this time.  He did not take any medicine such as Tums.  He did take 2 baby aspirin prior to arrival.  In addition, he reports right-sided headache as well as right arm weakness for several months.  He has not had any workup for this.  He states his mother  of an aneurysm and is concerned that he may have some abnormality.  He states he does have a history of cervical spine radiculopathy acting his right side however he states that this has been resolved for many years.  He states he has noticed weakness in the right arm for many months but has not seen his provider to discuss symptomology.  He has been checking his blood pressure at home weekly and reports he does not look at the top number but notes his bottom number is usually around 90.  He states today he was 151/105, 163/122 during the episode of chest pressure therefore he called his primary care provider who suggested he report to the emergency department for further evaluation.  He takes no prescription medications.      PMD: Slick  YOHANNES Yates    REVIEW OF SYSTEMS  Review of Systems   Constitutional:  Positive for fatigue. Negative for activity change, appetite change, chills, diaphoresis, fever and unexpected weight change.   HENT:  Negative for congestion, facial swelling, postnasal drip, rhinorrhea, sinus pressure, sinus pain, sneezing and sore throat.    Eyes:  Negative for itching and visual disturbance.   Respiratory:  Positive for chest tightness. Negative for cough, shortness of breath and wheezing.    Cardiovascular:  Positive for chest pain. Negative for palpitations and leg swelling.   Gastrointestinal:  Negative for abdominal pain, diarrhea, nausea and vomiting.   Genitourinary: Negative.    Musculoskeletal:  Negative for arthralgias, back pain and myalgias.   Skin:  Negative for pallor and rash.   Neurological:  Positive for weakness. Negative for dizziness, tremors, syncope, facial asymmetry, speech difficulty, light-headedness, numbness and headaches.   Hematological: Negative.    Psychiatric/Behavioral: Negative.           PAST MEDICAL HISTORY  Active Ambulatory Problems     Diagnosis Date Noted    Cough 06/22/2016    Right shoulder pain 06/22/2016    Allergic rhinitis 06/22/2016    Health care maintenance 06/22/2016    S/P cervical spinal fusion 06/22/2016    Hyperlipidemia 06/22/2016    Primary snoring 06/22/2016    Adenomatous polyp of sigmoid colon 04/24/2018    Herpes zoster without complication 04/17/2019    Postherpetic neuralgia 05/16/2019    Excessive cerumen in both ear canals 07/29/2020    Elevated blood pressure reading in office with diagnosis of hypertension 07/29/2020    Diverticulitis of large intestine without perforation or abscess without bleeding 09/16/2022     Resolved Ambulatory Problems     Diagnosis Date Noted    Tick bite 06/22/2016    Febrile 06/22/2016    Screening PSA (prostate specific antigen) 02/05/2018    Colon cancer screening 03/01/2018    Vertigo 07/29/2020    Vertigo, benign paroxysmal,  bilateral 08/11/2020     Past Medical History:   Diagnosis Date    Colon polyp     Kidney stone        PAST SURGICAL HISTORY  Past Surgical History:   Procedure Laterality Date    COLONOSCOPY      COLONOSCOPY W/ POLYPECTOMY N/A 3/30/2018    Procedure: COLONOSCOPY,  polypectomy;  Surgeon: Danita Springer MD;  Location: [facility];  Service: Gastroenterology    NECK SURGERY         FAMILY HISTORY  Family History   Problem Relation Age of Onset    Prostate cancer Brother         Brother is still awaiting results     Heart attack Brother     Colon polyps Neg Hx     Colon cancer Neg Hx        SOCIAL HISTORY  Social History     Socioeconomic History    Marital status:    Tobacco Use    Smoking status: Never    Smokeless tobacco: Never   Substance and Sexual Activity    Alcohol use: No    Drug use: No    Sexual activity: Yes     Partners: Female       ALLERGIES  Naproxen and Valtrex [valacyclovir]      Current Facility-Administered Medications:     sodium chloride 0.9 % flush 10 mL, 10 mL, Intravenous, PRN, Alethea Garcia, APRN    Current Outpatient Medications:     aspirin 81 MG chewable tablet, Chew 2 tablets Daily., Disp: , Rfl:     lisinopril (PRINIVIL,ZESTRIL) 10 MG tablet, Take 1 tablet by mouth Daily., Disp: 30 tablet, Rfl: 0    Multiple Vitamin (MULTI-VITAMIN DAILY PO), Take 1 tablet by mouth Daily., Disp: , Rfl:     Red Yeast Rice 600 MG tablet, Take 1 tablet by mouth daily., Disp: 30 tablet, Rfl: 0    PHYSICAL EXAM  ED Triage Vitals [11/14/24 1207]   Temp Heart Rate Resp BP SpO2   98.2 °F (36.8 °C) 69 18 146/95 98 %      Temp src Heart Rate Source Patient Position BP Location FiO2 (%)   Oral Monitor Sitting Right arm --       Physical Exam  Vitals and nursing note reviewed.   Constitutional:       General: He is not in acute distress.     Appearance: Normal appearance. He is normal weight. He is not ill-appearing, toxic-appearing or diaphoretic.   HENT:      Head: Normocephalic and atraumatic.       Right Ear: External ear normal.      Left Ear: External ear normal.      Nose: Nose normal. No congestion or rhinorrhea.      Mouth/Throat:      Mouth: Mucous membranes are moist.      Pharynx: Oropharynx is clear.   Eyes:      Conjunctiva/sclera: Conjunctivae normal.   Cardiovascular:      Rate and Rhythm: Normal rate and regular rhythm.      Pulses: Normal pulses.      Heart sounds: Normal heart sounds. No murmur heard.     No gallop.   Pulmonary:      Effort: Pulmonary effort is normal. No respiratory distress.      Breath sounds: Normal breath sounds. No wheezing.   Abdominal:      General: Bowel sounds are normal. There is no distension.      Tenderness: There is no abdominal tenderness. There is no right CVA tenderness or left CVA tenderness.   Musculoskeletal:         General: No swelling, tenderness, deformity or signs of injury. Normal range of motion.      Cervical back: Normal range of motion and neck supple. No rigidity or tenderness.      Right lower leg: No edema.      Left lower leg: No edema.   Lymphadenopathy:      Cervical: No cervical adenopathy.   Skin:     General: Skin is warm and dry.      Capillary Refill: Capillary refill takes less than 2 seconds.      Coloration: Skin is not pale.   Neurological:      General: No focal deficit present.      Mental Status: He is alert.      Motor: Weakness present.      Comments: Right arm  4/5, left arm  5/5.ROM intact bilateral upper extremities and BLE. Sensation intact    Psychiatric:         Mood and Affect: Mood normal.         Behavior: Behavior normal.         Thought Content: Thought content normal.           LAB RESULTS  Lab Results (last 24 hours)       Procedure Component Value Units Date/Time    CBC & Differential [[id number]]  (Normal) Collected: 11/14/24 1235    Specimen: Blood from Arm, Left Updated: 11/14/24 1248    Narrative:      The following orders were created for panel order CBC & Differential.  Procedure                                Abnormality         Status                     ---------                               -----------         ------                     CBC Auto Differential[[id number]]        Normal              Final result                 Please view results for these tests on the individual orders.    Comprehensive Metabolic Panel [[id number]]  (Abnormal) Collected: 11/14/24 1235    Specimen: Blood from Arm, Left Updated: 11/14/24 1301     Glucose 105 mg/dL      BUN 14 mg/dL      Creatinine 0.90 mg/dL      Sodium 139 mmol/L      Potassium 4.0 mmol/L      Chloride 105 mmol/L      CO2 24.9 mmol/L      Calcium 9.2 mg/dL      Total Protein 6.8 g/dL      Albumin 4.2 g/dL      ALT (SGPT) 20 U/L      AST (SGOT) 23 U/L      Alkaline Phosphatase 72 U/L      Total Bilirubin 0.7 mg/dL      Globulin 2.6 gm/dL      A/G Ratio 1.6 g/dL      BUN/Creatinine Ratio 15.6     Anion Gap 9.1 mmol/L      eGFR 98.4 mL/min/1.73     Narrative:      GFR Normal >60  Chronic Kidney Disease <60  Kidney Failure <15      High Sensitivity Troponin T [[id number]]  (Normal) Collected: 11/14/24 1235    Specimen: Blood from Arm, Left Updated: 11/14/24 1301     HS Troponin T 8 ng/L     Narrative:      High Sensitive Troponin T Reference Range:  <14.0 ng/L- Negative Female for AMI  <22.0 ng/L- Negative Male for AMI  >=14 - Abnormal Female indicating possible myocardial injury.  >=22 - Abnormal Male indicating possible myocardial injury.   Clinicians would have to utilize clinical acumen, EKG, Troponin, and serial changes to determine if it is an Acute Myocardial Infarction or myocardial injury due to an underlying chronic condition.         CBC Auto Differential [[id number]]  (Normal) Collected: 11/14/24 1235    Specimen: Blood from Arm, Left Updated: 11/14/24 1248     WBC 4.96 10*3/mm3      RBC 5.06 10*6/mm3      Hemoglobin 15.1 g/dL      Hematocrit 44.0 %      MCV 87.0 fL      MCH 29.8 pg      MCHC 34.3 g/dL      RDW 12.3 %      RDW-SD 39.2 fl      MPV 9.8 fL       Platelets 179 10*3/mm3      Neutrophil % 52.8 %      Lymphocyte % 33.3 %      Monocyte % 11.5 %      Eosinophil % 1.4 %      Basophil % 0.8 %      Immature Grans % 0.2 %      Neutrophils, Absolute 2.62 10*3/mm3      Lymphocytes, Absolute 1.65 10*3/mm3      Monocytes, Absolute 0.57 10*3/mm3      Eosinophils, Absolute 0.07 10*3/mm3      Basophils, Absolute 0.04 10*3/mm3      Immature Grans, Absolute 0.01 10*3/mm3      nRBC 0.0 /100 WBC     High Sensitivity Troponin T 2Hr [[id number]]  (Normal) Collected: 11/14/24 1422    Specimen: Blood Updated: 11/14/24 1449     HS Troponin T 7 ng/L      Troponin T Delta -1 ng/L     Narrative:      High Sensitive Troponin T Reference Range:  <14.0 ng/L- Negative Female for AMI  <22.0 ng/L- Negative Male for AMI  >=14 - Abnormal Female indicating possible myocardial injury.  >=22 - Abnormal Male indicating possible myocardial injury.   Clinicians would have to utilize clinical acumen, EKG, Troponin, and serial changes to determine if it is an Acute Myocardial Infarction or myocardial injury due to an underlying chronic condition.                   I ordered the above labs and reviewed the results    RADIOLOGY  XR Chest 1 View    Result Date: 11/14/2024  XR CHEST 1 VW Date of Exam: 11/14/2024 12:49 PM EST Indication: Chest Pain Protocol Chest Pain Protocol Comparison: Chest x-ray 5/28/2015 Findings: There are no airspace consolidations. No pleural fluid. No pneumothorax. The pulmonary vasculature appears within normal limits. The cardiac and mediastinal silhouette appear unremarkable. No acute osseous abnormality identified.     Impression: No acute cardiopulmonary process. Electronically Signed: Shayna Gu MD  11/14/2024 1:33 PM EST  Workstation ID: [id number]    CT Head Without Contrast    Result Date: 11/14/2024  CT HEAD WO CONTRAST, CT CERVICAL SPINE WO CONTRAST Date of Exam: 11/14/2024 12:50 PM EST Indication: HA left sided x months. no fall or injury. Comparison: None  available. Technique: Axial CT images were obtained of the head without contrast administration.  Coronal reconstructions were performed.  Automated exposure control and iterative reconstruction methods were used. Findings: Head: No fluid in the visualized paranasal sinuses/middle ear cavities. No acute skull abnormality. Intracranially, no hemorrhage, edema, or mass effect. CSF spaces within normal limits Cervical spine: No fracture or destructive bone lesion. Status post anterior cervical discectomy and fusion at C5-C6. The plate and screws are well seated and intact. Graft material appears incorporated, with no pseudoarthrosis. Mild degenerative disc disease at C4-C5 and C6-C7. Facet osteoarthritis at C6-C7 on the right, and at C2-C3, and C5-C6 through C7-T1 on the left. No severe bony foraminal stenosis     Impression: 1. No acute intracranial abnormality. 2. No acute abnormality of the cervical spine 3. Status post C5-C6 ACDF without complication 4. Cervical degenerative disease Electronically Signed: Quinten Ramos  11/14/2024 1:05 PM EST  Workstation ID: [id number]    CT Cervical Spine Without Contrast    Result Date: 11/14/2024  CT HEAD WO CONTRAST, CT CERVICAL SPINE WO CONTRAST Date of Exam: 11/14/2024 12:50 PM EST Indication: HA left sided x months. no fall or injury. Comparison: None available. Technique: Axial CT images were obtained of the head without contrast administration.  Coronal reconstructions were performed.  Automated exposure control and iterative reconstruction methods were used. Findings: Head: No fluid in the visualized paranasal sinuses/middle ear cavities. No acute skull abnormality. Intracranially, no hemorrhage, edema, or mass effect. CSF spaces within normal limits Cervical spine: No fracture or destructive bone lesion. Status post anterior cervical discectomy and fusion at C5-C6. The plate and screws are well seated and intact. Graft material appears incorporated, with no  pseudoarthrosis. Mild degenerative disc disease at C4-C5 and C6-C7. Facet osteoarthritis at C6-C7 on the right, and at C2-C3, and C5-C6 through C7-T1 on the left. No severe bony foraminal stenosis     Impression: 1. No acute intracranial abnormality. 2. No acute abnormality of the cervical spine 3. Status post C5-C6 ACDF without complication 4. Cervical degenerative disease Electronically Signed: Quitnen Ramos  11/14/2024 1:05 PM EST  Workstation ID: [id number]     I ordered the above radiologic testing and reviewed the results    PROCEDURES  ECG 12 Lead      Date/Time: 11/14/2024 12:29 PM    Performed by: Alethea Garcia APRN  Authorized by: Tyson Cole MD  Interpreted by ED physician  Comparison: compared with previous ECG from 2/5/2018  Comparison to previous ECG: AZ interval prolonged   Rhythm: sinus rhythm  Rate: normal  QRS axis: normal  Conduction: conduction normal  Clinical impression: non-specific ECG          PROGRESS AND CONSULTS  ED Course as of 11/14/24 1513   Thu Nov 14, 2024   1227 Will begin cardiac workup with the presumption that this being chest pain rather than GERD.  Obtain head CT and cervical spine for right arm weakness and headache.  Patient will likely need neurology consultation on a nonemergent basis for his right arm weakness. [VT]   1309 Troponin normal, CBC and CMP are unremarkable. EKG does not reveal any evidence of ischemia. This is reassuring.    [VT]   1321 Will repeat 2 hour troponin as onset of pain was today. Will refer to cardiology for further workup  [VT]   1508 HEART score 2  [VT]   1508 Discussed all findings with patient including red flags of angioedema or cough that could be side effects of lisinopril.  First dose of lisinopril has been given here.  This has been discussed with his primary care provider, Jodee who will see patient as a follow-up.  Also encourage patient to take Pepcid twice daily to see if this has any improvement should his  symptoms return. [VT]   1510 He has had no chest discomfort during his ER visit.  Repeat troponin continues to be normal. [VT]      ED Course User Index  [VT] Alethea Garcia APRN           MEDICAL DECISION MAKING    MDM       My differential diagnosis for chest pain includes but is not limited to:  Muscle strain, costochondritis, myositis, pleurisy, rib fracture, intercostal neuritis, herpes zoster, tumor, myocardial infarction, coronary syndrome, unstable angina, angina, aortic dissection, mitral valve prolapse, pericarditis, palpitations, pulmonary embolus, pneumonia, pneumothorax, lung cancer, GERD, esophagitis, esophageal spasm     DIAGNOSIS  Final diagnoses:   Primary hypertension   Right arm weakness   Chest pain, unspecified type   Nonintractable episodic headache, unspecified headache type       Latest Documented Vital Signs:  As of 15:13 EST  BP- 127/89 HR- 59 Temp- 98.2 °F (36.8 °C) (Oral) O2 sat- 95%    DISPOSITION  home        Discussed pertinent findings with the patient/family.  Patient/Family voiced understanding of need to follow-up for recheck and further testing as needed.  Return to the Emergency Department warnings were given.         Medication List        New Prescriptions      lisinopril 10 MG tablet  Commonly known as: PRINIVIL,ZESTRIL  Take 1 tablet by mouth Daily.            Stop      ciprofloxacin 500 MG tablet  Commonly known as: Cipro     metroNIDAZOLE 500 MG tablet  Commonly known as: Flagyl               Where to Get Your Medications        These medications were sent to [facility] [id number] [place], [place] - [address] [facility] [address] - [phone]  - [phone]  [address] [address]      Phone: [phone]   lisinopril 10 MG tablet              Follow-up Information       Trudy Kruger APRN. Schedule an appointment as soon as possible for a visit in 1 week.    Specialties: Family Medicine, Emergency Medicine  Contact information:  [address]  [address]  [place] [address]  [phone]                               Dictated utilizing Dragon dictation     Alethea Garcia, APRN  11/14/24 1885

## 2024-11-14 NOTE — TELEPHONE ENCOUNTER
His BP keeps going up and he has had some heartburn.  BP was 151/105 he just took it again and it Is 163/123 I told him he needs to go to the ER.  At first he did not want to go to the ER but he said he will go to the ER now.

## 2024-11-15 ENCOUNTER — TELEPHONE (OUTPATIENT)
Dept: INTERNAL MEDICINE | Facility: CLINIC | Age: 59
End: 2024-11-15
Payer: COMMERCIAL

## 2024-11-15 LAB
QT INTERVAL: 406 MS
QTC INTERVAL: 408 MS

## 2024-11-25 ENCOUNTER — OFFICE VISIT (OUTPATIENT)
Dept: INTERNAL MEDICINE | Facility: CLINIC | Age: 59
End: 2024-11-25
Payer: COMMERCIAL

## 2024-11-25 VITALS
SYSTOLIC BLOOD PRESSURE: 122 MMHG | WEIGHT: 206 LBS | HEIGHT: 70 IN | TEMPERATURE: 98.6 F | OXYGEN SATURATION: 98 % | BODY MASS INDEX: 29.49 KG/M2 | DIASTOLIC BLOOD PRESSURE: 78 MMHG | HEART RATE: 77 BPM

## 2024-11-25 DIAGNOSIS — E78.5 HYPERLIPIDEMIA LDL GOAL <100: ICD-10-CM

## 2024-11-25 DIAGNOSIS — Z12.5 SCREENING FOR MALIGNANT NEOPLASM OF PROSTATE: ICD-10-CM

## 2024-11-25 DIAGNOSIS — I10 PRIMARY HYPERTENSION: Primary | ICD-10-CM

## 2024-11-25 DIAGNOSIS — Z23 NEED FOR TDAP VACCINATION: ICD-10-CM

## 2024-11-25 DIAGNOSIS — M77.01 GOLFER'S ELBOW, RIGHT: ICD-10-CM

## 2024-11-25 DIAGNOSIS — Z12.11 ENCOUNTER FOR SCREENING COLONOSCOPY: ICD-10-CM

## 2024-11-25 PROCEDURE — 90715 TDAP VACCINE 7 YRS/> IM: CPT | Performed by: NURSE PRACTITIONER

## 2024-11-25 PROCEDURE — 99214 OFFICE O/P EST MOD 30 MIN: CPT | Performed by: NURSE PRACTITIONER

## 2024-11-25 PROCEDURE — 90471 IMMUNIZATION ADMIN: CPT | Performed by: NURSE PRACTITIONER

## 2024-11-25 RX ORDER — LISINOPRIL 10 MG/1
10 TABLET ORAL DAILY
Qty: 90 TABLET | Refills: 1 | Status: SHIPPED | OUTPATIENT
Start: 2024-11-25

## 2024-11-25 NOTE — PROGRESS NOTES
Chief Complaint   Patient presents with    Hospital Follow Up Visit     ED 11/14, HTN       Subjective     Asad Jones is a 59 y.o. male being seen for a follow up appointment today regarding HTN and CP. He was sent to the ER 11- for complaints of right arm weakness and Chest pain, see telephone note. He was concerned due to twin brother with heart attack.A troponin was completed, CMP, CBC, CXR, CT head, and ECG. He was started on lisinopril 10mg and referred to neurology for Right arm weakness. He leonela any further episode of chest pain, SOA, edema. He is tolerating BP meds well and BP 110s/70s.     He reports RUE is getting progressively weakness for 4-5 months. He denies tinlging in hand.  He has pain in right inner elbow. He uses a sledge hammer daily.     History of Present Illness     Allergies   Allergen Reactions    Naproxen Angioedema    Valtrex [Valacyclovir] Dizziness         Current Outpatient Medications:     aspirin 81 MG chewable tablet, Chew 2 tablets Daily., Disp: , Rfl:     lisinopril (PRINIVIL,ZESTRIL) 10 MG tablet, Take 1 tablet by mouth Daily., Disp: 30 tablet, Rfl: 0    Multiple Vitamin (MULTI-VITAMIN DAILY PO), Take 1 tablet by mouth Daily., Disp: , Rfl:     Red Yeast Rice 600 MG tablet, Take 1 tablet by mouth daily., Disp: 30 tablet, Rfl: 0    The following portions of the patient's history were reviewed and updated as appropriate: allergies, current medications, past family history, past medical history, past social history, past surgical history, and problem list.    Review of Systems   Eyes: Negative.    Respiratory: Negative.     Cardiovascular: Negative.  Negative for chest pain, palpitations and leg swelling.   Endocrine: Negative.    Genitourinary: Negative.    Musculoskeletal:  Positive for arthralgias.   Allergic/Immunologic: Negative.    Neurological:  Positive for weakness (RUE) and headaches. Negative for light-headedness and numbness.   Hematological: Negative.     Psychiatric/Behavioral: Negative.     All other systems reviewed and are negative.      Assessment     Physical Exam  Vitals reviewed.   Constitutional:       Appearance: Normal appearance. He is not ill-appearing.   HENT:      Head: Normocephalic.      Right Ear: Tympanic membrane normal.      Left Ear: Tympanic membrane normal.   Cardiovascular:      Rate and Rhythm: Normal rate and regular rhythm.      Pulses: Normal pulses.      Heart sounds: Normal heart sounds. No murmur heard.  Pulmonary:      Effort: Pulmonary effort is normal. No respiratory distress.      Breath sounds: Normal breath sounds. No stridor.   Musculoskeletal:      Right elbow: Swelling present. Normal range of motion. Tenderness present in medial epicondyle.   Neurological:      General: No focal deficit present.      Mental Status: He is alert and oriented to person, place, and time.   Psychiatric:         Mood and Affect: Mood normal.         Behavior: Behavior normal.         Thought Content: Thought content normal.         Plan     His ER records including labs, CXR, ECG  and CT head reviewed from 11-    Diagnoses and all orders for this visit:    1. Primary hypertension (Primary)  -     CBC & Differential  -     Comprehensive Metabolic Panel  -     Lipid Panel With / Chol / HDL Ratio  -     Thyroid Cascade Profile  -     lisinopril (PRINIVIL,ZESTRIL) 10 MG tablet; Take 1 tablet by mouth Daily.  Dispense: 90 tablet; Refill: 1    2. Hyperlipidemia LDL goal <100  -     CBC & Differential  -     Comprehensive Metabolic Panel  -     Lipid Panel With / Chol / HDL Ratio    3. Golfer's elbow, right    4. Encounter for screening colonoscopy  -     Ambulatory Referral For Screening Colonoscopy    5. Screening for malignant neoplasm of prostate  -     PSA Screen    6. Need for Tdap vaccination  -     Tdap Vaccine Greater Than or Equal To 6yo IM        CPE in 3 months w labs

## 2024-11-26 LAB
ALBUMIN SERPL-MCNC: 4.3 G/DL (ref 3.5–5.2)
ALBUMIN/GLOB SERPL: 1.8 G/DL
ALP SERPL-CCNC: 78 U/L (ref 39–117)
ALT SERPL-CCNC: 24 U/L (ref 1–41)
AST SERPL-CCNC: 24 U/L (ref 1–40)
BASOPHILS # BLD AUTO: 0.04 10*3/MM3 (ref 0–0.2)
BASOPHILS NFR BLD AUTO: 0.7 % (ref 0–1.5)
BILIRUB SERPL-MCNC: 0.9 MG/DL (ref 0–1.2)
BUN SERPL-MCNC: 18 MG/DL (ref 6–20)
BUN/CREAT SERPL: 16.8 (ref 7–25)
CALCIUM SERPL-MCNC: 9.3 MG/DL (ref 8.6–10.5)
CHLORIDE SERPL-SCNC: 105 MMOL/L (ref 98–107)
CHOLEST SERPL-MCNC: 219 MG/DL (ref 0–200)
CHOLEST/HDLC SERPL: 3.78 {RATIO}
CO2 SERPL-SCNC: 24.6 MMOL/L (ref 22–29)
CREAT SERPL-MCNC: 1.07 MG/DL (ref 0.76–1.27)
EGFRCR SERPLBLD CKD-EPI 2021: 79.9 ML/MIN/1.73
EOSINOPHIL # BLD AUTO: 0.24 10*3/MM3 (ref 0–0.4)
EOSINOPHIL NFR BLD AUTO: 4 % (ref 0.3–6.2)
ERYTHROCYTE [DISTWIDTH] IN BLOOD BY AUTOMATED COUNT: 12 % (ref 12.3–15.4)
GLOBULIN SER CALC-MCNC: 2.4 GM/DL
GLUCOSE SERPL-MCNC: 88 MG/DL (ref 65–99)
HCT VFR BLD AUTO: 47.3 % (ref 37.5–51)
HDLC SERPL-MCNC: 58 MG/DL (ref 40–60)
HGB BLD-MCNC: 16.1 G/DL (ref 13–17.7)
IMM GRANULOCYTES # BLD AUTO: 0.02 10*3/MM3 (ref 0–0.05)
IMM GRANULOCYTES NFR BLD AUTO: 0.3 % (ref 0–0.5)
LDLC SERPL CALC-MCNC: 144 MG/DL (ref 0–100)
LYMPHOCYTES # BLD AUTO: 1.89 10*3/MM3 (ref 0.7–3.1)
LYMPHOCYTES NFR BLD AUTO: 31.7 % (ref 19.6–45.3)
MCH RBC QN AUTO: 30.4 PG (ref 26.6–33)
MCHC RBC AUTO-ENTMCNC: 34 G/DL (ref 31.5–35.7)
MCV RBC AUTO: 89.4 FL (ref 79–97)
MONOCYTES # BLD AUTO: 0.56 10*3/MM3 (ref 0.1–0.9)
MONOCYTES NFR BLD AUTO: 9.4 % (ref 5–12)
NEUTROPHILS # BLD AUTO: 3.21 10*3/MM3 (ref 1.7–7)
NEUTROPHILS NFR BLD AUTO: 53.9 % (ref 42.7–76)
NRBC BLD AUTO-RTO: 0 /100 WBC (ref 0–0.2)
PLATELET # BLD AUTO: 189 10*3/MM3 (ref 140–450)
POTASSIUM SERPL-SCNC: 4.2 MMOL/L (ref 3.5–5.2)
PROT SERPL-MCNC: 6.7 G/DL (ref 6–8.5)
PSA SERPL-MCNC: 0.6 NG/ML (ref 0–4)
RBC # BLD AUTO: 5.29 10*6/MM3 (ref 4.14–5.8)
SODIUM SERPL-SCNC: 142 MMOL/L (ref 136–145)
TRIGL SERPL-MCNC: 94 MG/DL (ref 0–150)
TSH SERPL DL<=0.005 MIU/L-ACNC: 1.13 UIU/ML (ref 0.45–4.5)
VLDLC SERPL CALC-MCNC: 17 MG/DL (ref 5–40)
WBC # BLD AUTO: 5.96 10*3/MM3 (ref 3.4–10.8)

## 2024-11-26 RX ORDER — ATORVASTATIN CALCIUM 20 MG/1
20 TABLET, FILM COATED ORAL NIGHTLY
Qty: 90 TABLET | Refills: 1 | Status: SHIPPED | OUTPATIENT
Start: 2024-11-26

## 2024-12-03 ENCOUNTER — OFFICE VISIT (OUTPATIENT)
Dept: CARDIOLOGY | Facility: CLINIC | Age: 59
End: 2024-12-03
Payer: COMMERCIAL

## 2024-12-03 VITALS
HEART RATE: 80 BPM | HEIGHT: 70 IN | OXYGEN SATURATION: 96 % | SYSTOLIC BLOOD PRESSURE: 120 MMHG | BODY MASS INDEX: 29.22 KG/M2 | DIASTOLIC BLOOD PRESSURE: 82 MMHG | WEIGHT: 204.1 LBS

## 2024-12-03 DIAGNOSIS — I10 PRIMARY HYPERTENSION: ICD-10-CM

## 2024-12-03 DIAGNOSIS — R07.2 PRECORDIAL PAIN: ICD-10-CM

## 2024-12-03 DIAGNOSIS — E78.5 HYPERLIPIDEMIA LDL GOAL <100: Primary | ICD-10-CM

## 2024-12-03 DIAGNOSIS — Z82.49 FAMILY HISTORY OF AORTIC ANEURYSM: ICD-10-CM

## 2024-12-03 PROCEDURE — 93000 ELECTROCARDIOGRAM COMPLETE: CPT | Performed by: STUDENT IN AN ORGANIZED HEALTH CARE EDUCATION/TRAINING PROGRAM

## 2024-12-03 PROCEDURE — 99204 OFFICE O/P NEW MOD 45 MIN: CPT | Performed by: STUDENT IN AN ORGANIZED HEALTH CARE EDUCATION/TRAINING PROGRAM

## 2024-12-03 RX ORDER — CETIRIZINE HYDROCHLORIDE 5 MG/1
5 TABLET ORAL DAILY
COMMUNITY

## 2024-12-03 NOTE — PATIENT INSTRUCTIONS
We will get a treadmill and an ultrasound to make sure that the symptoms you experienced were, indeed, reflux and not the heart.     I would recommend we get a calcium score and vascular screening at [facility] afterward to assess future risk.

## 2024-12-03 NOTE — PROGRESS NOTES
[facility]    Subjective:     Encounter Date:24      Patient ID: Asad Jones is a 59 y.o. male.    Chief Complaint:   Chief Complaint   Patient presents with    New Patient      Chest pain      History of Present Illness    Asad Jones is a 59 y.o. gentleman past medical history hyperlipidemia, hypertension, who presents for further evaluation of chest pain.  He was seen in the emerged permit on  for chest pressure and discomfort with concomitant hypertension.  He had also a headache at the same time.  He did not take any medications at that time but was hypertensive with systolic in the 150s with diastolics over 100.He was started on lisinopril, as well as famotidine for chest pain    He underwent troponin evaluation with high sensitive troponins being 8, and 7, no significant delta.  This was in the setting of significant hypertension.    His LDL was 144 and he was started on atorvastatin.  Thyroid testing was normal.    Today, he reports that after starting medications, he has not had a return of the symptoms.  He has no complaints today.   He does report that his brother had an MI about 3 years ago, but he weighs about 50 pounds heavier and had a more sedentary lifestyle.  His father had coronary heart disease.  His mom  of a brain aneurysm but he had a brain MRA in 2018 that was normal    Previous Cardiac Testing:  I reviewed his ECG from .  It shows sinus rhythm.  There is mention of first-degree AV block, however I find this borderline.  No ischemia.    Chest x-ray showed normal cardiac silhouette.  No acute process identified.    The following portions of the patient's history were reviewed and updated as appropriate: allergies, current medications, past family history, past medical history, past social history, past surgical history and problem list.    Past Medical History:   Diagnosis Date    Colon polyp     Hyperlipidemia     Hypertension  "11-14-24    Kidney stone        Past Surgical History:   Procedure Laterality Date    COLONOSCOPY      COLONOSCOPY W/ POLYPECTOMY N/A 3/30/2018    Procedure: COLONOSCOPY,  polypectomy;  Surgeon: Danita Springer MD;  Location: [facility];  Service: Gastroenterology    NECK SURGERY             ECG 12 Lead    Date/Time: 12/3/2024 8:48 AM  Performed by: Andrea Valdez MD    Authorized by: Andrea Valdez MD  Comparison: compared with previous ECG from 11/14/2024  Similar to previous ECG  Rhythm: sinus rhythm  Rate: normal  Conduction: conduction normal  ST Segments: ST segments normal  T Waves: T waves normal  QRS axis: normal  Other: no other findings    Clinical impression: normal ECG             Objective:     Vitals:    12/03/24 0839   BP: 120/82   Pulse: 80   SpO2: 96%   Weight: 92.6 kg (204 lb 1.6 oz)   Height: 177.8 cm (70\")         Constitutional:       Appearance: Healthy appearance. Not in distress.   Neck:      Vascular: JVD normal.   Pulmonary:      Effort: Pulmonary effort is normal.      Breath sounds: Normal breath sounds.   Cardiovascular:      PMI at left midclavicular line. Normal rate. Regular rhythm. Normal S2.       Murmurs: There is no murmur.   Pulses:     Intact distal pulses.   Edema:     Peripheral edema absent.   Skin:     General: Skin is warm and dry.   Neurological:      General: No focal deficit present.      Mental Status: Alert, oriented to person, place, and time and oriented to person, place and time.   Psychiatric:         Mood and Affect: Mood and affect normal.         Lab Review:     Lipid Panel          11/25/2024    09:40   Lipid Panel   Total Cholesterol 219    Triglycerides 94    HDL Cholesterol 58    VLDL Cholesterol 17    LDL Cholesterol  144      BUN   Date Value Ref Range Status   11/25/2024 18 6 - 20 mg/dL Final   11/14/2024 14 6 - 20 mg/dL Final     Creatinine   Date Value Ref Range Status   11/25/2024 1.07 0.76 - 1.27 mg/dL Final   11/14/2024 0.90 0.76 - 1.27 mg/dL Final "     Potassium   Date Value Ref Range Status   11/25/2024 4.2 3.5 - 5.2 mmol/L Final   11/14/2024 4.0 3.5 - 5.2 mmol/L Final     ALT (SGPT)   Date Value Ref Range Status   11/25/2024 24 1 - 41 U/L Final   11/14/2024 20 1 - 41 U/L Final     AST (SGOT)   Date Value Ref Range Status   11/25/2024 24 1 - 40 U/L Final   11/14/2024 23 1 - 40 U/L Final         Performed        Assessment:          Diagnosis Plan   1. Hyperlipidemia LDL goal <100  ECG 12 Lead    Vascular Screening (Bundle) CAR    CT Cardiac Calcium Score Without Dye    Treadmill Stress Test      2. Primary hypertension  Vascular Screening (Bundle) CAR    CT Cardiac Calcium Score Without Dye    Treadmill Stress Test      3. Precordial pain  ECG 12 Lead    Vascular Screening (Bundle) CAR    CT Cardiac Calcium Score Without Dye    Treadmill Stress Test    Adult Transthoracic Echo Complete w/ Color, Spectral and Contrast if necessary per protocol      4. Family history of aortic aneurysm  Adult Transthoracic Echo Complete w/ Color, Spectral and Contrast if necessary per protocol             Plan:         Chest pain: Hypertensive versus GERD related.  Unlikely cardiac ischemia.  Ruled out for ACS in the ER.  We will risk stratification with treadmill stress test  Check echocardiogram  Right arm weakness: Possible radiculopathy/cervical spinal disease.  Neurology appointment upcoming.  He is physically active and uses a sledgehammer with the right arm as well.  Hyperlipidemia: Reasonable to continue atorvastatin 20 mg.  ASCVD risk constitutes statin therapy.  He does have a strong family history of coronary heart disease and had is a twin brother who had an MI 3 years ago.  Would recommend vascular screens at [facility] depending if the above workup is negative, this could help tailor his cholesterol therapy.      RTC 6 months for risk factor reassessment.  Treadmill, echo and vascular screens in the interim.    Andrea Valdez MD  [facility]  12/03/24  08:41 EST       Current Outpatient Medications:     aspirin 81 MG chewable tablet, Chew 2 tablets Daily., Disp: , Rfl:     atorvastatin (Lipitor) 20 MG tablet, Take 1 tablet by mouth Every Night., Disp: 90 tablet, Rfl: 1    cetirizine (zyrTEC) 5 MG tablet, Take 1 tablet by mouth Daily., Disp: , Rfl:     lisinopril (PRINIVIL,ZESTRIL) 10 MG tablet, Take 1 tablet by mouth Daily., Disp: 90 tablet, Rfl: 1    Multiple Vitamin (MULTI-VITAMIN DAILY PO), Take 1 tablet by mouth Daily., Disp: , Rfl:          Return in about 6 months (around 6/3/2025).      Part of this note may be an electronic transcription/translation of spoken language to printed text using the Dragon Dictation System.

## 2024-12-09 ENCOUNTER — TELEPHONE (OUTPATIENT)
Dept: CARDIOLOGY | Facility: CLINIC | Age: 59
End: 2024-12-09

## 2024-12-09 NOTE — TELEPHONE ENCOUNTER
Left message advising of recommendations. Advised to call office with any further questions or concerns.    SILVERIO Owuus

## 2024-12-09 NOTE — TELEPHONE ENCOUNTER
Caller: Asad Jones    Relationship: Self    Best call back number: [phone]    What is the best time to reach you: ANY    Who are you requesting to speak with (clinical staff, provider,  specific staff member): CLINICAL      What was the call regarding: PATIENT STATED THAT HE WOULD LIKE TO DISCUSS THE ECHO AND STRESS TEST THAT DR. HENDERSON WANTS HIM TO HAVE DONE. PATIENT STATED THAT HE WANTS TO KNOW IF THESE ARE MEDICALLY NEEDED BEFORE HE HAS THEM DONE BECAUSE IT IS GOING TO COST $1100 OUT OF POCKET. PLEASE CONTACT PATIENT TO ADVISE. THANK YOU.     Is it okay if the provider responds through [employer]: YES OR CALL

## 2025-02-03 ENCOUNTER — OFFICE VISIT (OUTPATIENT)
Dept: NEUROLOGY | Facility: CLINIC | Age: 60
End: 2025-02-03
Payer: COMMERCIAL

## 2025-02-03 VITALS
BODY MASS INDEX: 29.62 KG/M2 | SYSTOLIC BLOOD PRESSURE: 118 MMHG | OXYGEN SATURATION: 97 % | HEART RATE: 79 BPM | DIASTOLIC BLOOD PRESSURE: 70 MMHG | WEIGHT: 206.4 LBS

## 2025-02-03 DIAGNOSIS — R29.898 RIGHT ARM WEAKNESS: Primary | ICD-10-CM

## 2025-02-03 PROCEDURE — 99204 OFFICE O/P NEW MOD 45 MIN: CPT | Performed by: PSYCHIATRY & NEUROLOGY

## 2025-02-03 NOTE — PROGRESS NOTES
"Notes by MA:        Subjective:     Dear Alethea, thank you for referring Mr. Jones for neurological consultation.  As you know, he is a 59-year-old right-handed gentleman who reports 9 months of worsening ability to use the right arm.  Initially he noted an inability to serve a volleyball overhead or work overhead with his arm.  This is worsened over time and feels like this is limited at this \"strength\" of the right arm.  He does not have significant numbness or loss of sensation.  He does have pain centered at the shoulder but extends down the forearm.  He has a little bit of elbow pain but no tenderness over the epicondyles.  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  Patient ID: Asad Jones is a 59 y.o. male.    History of Present Illness  The following portions of the patient's history were reviewed and updated as appropriate: allergies, current medications, past family history, past medical history, past social history, past surgical history, and problem list.    Review of Systems   Constitutional:  Negative for activity change, appetite change and fatigue.   HENT:  Negative for facial swelling, trouble swallowing and voice change.    Eyes:  Negative for photophobia, pain and visual disturbance.   Respiratory:  Negative for chest tightness, shortness of breath and wheezing.    Cardiovascular:  Negative for chest pain, palpitations and leg swelling.   Gastrointestinal:  Negative for abdominal pain, nausea and vomiting.   Endocrine: Negative for cold intolerance and heat intolerance.   Musculoskeletal:  Positive for back pain. Negative for arthralgias, gait problem, joint swelling, myalgias, neck pain and neck stiffness.   Neurological:  Positive for weakness. Negative for dizziness, tremors, seizures, syncope, facial asymmetry, speech difficulty, light-headedness, numbness and headaches.   Hematological:  Does not bruise/bleed " easily.   Psychiatric/Behavioral:  Negative for agitation, behavioral problems, confusion, decreased concentration, dysphoric mood, hallucinations, self-injury, sleep disturbance and suicidal ideas. The patient is not nervous/anxious and is not hyperactive.         Objective:    Neurological Exam   Awake alert pleasant cooperative oriented in all spheres with fluent speech and normal speech comprehension.    Cranial nerves II through XII normal and symmetric.    Motor exam reveals normal symmetric tone bulk and power throughout without drift or spasticity.  No adventitious movements.  Right biceps appears to be ever so slightly weaker than the left.  There is no focal atrophy.  There are no fasciculations.  There is no intrinsic hand muscle weakness.  There is no weakness of wrist extension, flexion, finger flexion, or thumb abduction.    The sensory exam reveals normal and symmetric sensation to light touch, temperature, vibration throughout.    Coordination testing reveals smooth and accurate finger-nose-finger bilaterally, normal heel-to-shin bilaterally and normal rhythmic rapid alternating movements.  Romberg testing is negative.    Tendon reflexes are 2+ and symmetric throughout including preserved ankle jerks bilaterally.  No ankle clonus.    Physical Exam  Limited range of motion of the right shoulder.  There is no medial or lateral epicondyle tenderness.  Assessment/Plan:     Diagnoses and all orders for this visit:    1. Right arm weakness (Primary)  -     XR Shoulder 2+ View Right; Future  -     Ambulatory Referral to Orthopedic Surgery     59-year-old gentleman with about 9 months of worsening limited mobility of the right shoulder and subjective associated right arm weakness.  I do not see clear signs of neurological compromise on exam today (he does have a very mild C5-6 weakness compared to the left arm that I suspect is referable to his remote C5-6 cervical surgery), no significant atrophy or  fasciculations and no loss of sensation.    I suspect that this is a musculoskeletal issue involving the shoulder and I will arrange for plain films of the shoulder and referral to orthopedics for further evaluation.  We would be happy to see him back for reevaluation at any time.  Thank you so much for the opportunity to participate in care of this pleasant gentleman.

## 2025-02-07 ENCOUNTER — PATIENT ROUNDING (BHMG ONLY) (OUTPATIENT)
Dept: NEUROLOGY | Facility: CLINIC | Age: 60
End: 2025-02-07
Payer: COMMERCIAL

## 2025-02-18 ENCOUNTER — TELEPHONE (OUTPATIENT)
Dept: INTERNAL MEDICINE | Facility: CLINIC | Age: 60
End: 2025-02-18
Payer: COMMERCIAL

## 2025-02-26 DIAGNOSIS — E78.5 HYPERLIPIDEMIA LDL GOAL <100: Primary | ICD-10-CM

## 2025-02-26 DIAGNOSIS — I10 PRIMARY HYPERTENSION: ICD-10-CM

## 2025-02-27 ENCOUNTER — LAB (OUTPATIENT)
Dept: INTERNAL MEDICINE | Facility: CLINIC | Age: 60
End: 2025-02-27
Payer: COMMERCIAL

## 2025-02-27 DIAGNOSIS — I10 PRIMARY HYPERTENSION: ICD-10-CM

## 2025-02-27 DIAGNOSIS — E78.5 HYPERLIPIDEMIA LDL GOAL <100: ICD-10-CM

## 2025-02-28 LAB
ALBUMIN SERPL-MCNC: 4.3 G/DL (ref 3.5–5.2)
ALBUMIN/GLOB SERPL: 1.8 G/DL
ALP SERPL-CCNC: 82 U/L (ref 39–117)
ALT SERPL-CCNC: 33 U/L (ref 1–41)
AST SERPL-CCNC: 29 U/L (ref 1–40)
BASOPHILS # BLD AUTO: 0.04 10*3/MM3 (ref 0–0.2)
BASOPHILS NFR BLD AUTO: 0.7 % (ref 0–1.5)
BILIRUB SERPL-MCNC: 1.1 MG/DL (ref 0–1.2)
BUN SERPL-MCNC: 16 MG/DL (ref 6–20)
BUN/CREAT SERPL: 15.5 (ref 7–25)
CALCIUM SERPL-MCNC: 9.5 MG/DL (ref 8.6–10.5)
CHLORIDE SERPL-SCNC: 105 MMOL/L (ref 98–107)
CHOLEST SERPL-MCNC: 152 MG/DL (ref 0–200)
CHOLEST/HDLC SERPL: 2.62 {RATIO}
CO2 SERPL-SCNC: 26 MMOL/L (ref 22–29)
CREAT SERPL-MCNC: 1.03 MG/DL (ref 0.76–1.27)
EGFRCR SERPLBLD CKD-EPI 2021: 83.7 ML/MIN/1.73
EOSINOPHIL # BLD AUTO: 0.11 10*3/MM3 (ref 0–0.4)
EOSINOPHIL NFR BLD AUTO: 2 % (ref 0.3–6.2)
ERYTHROCYTE [DISTWIDTH] IN BLOOD BY AUTOMATED COUNT: 12.3 % (ref 12.3–15.4)
GLOBULIN SER CALC-MCNC: 2.4 GM/DL
GLUCOSE SERPL-MCNC: 97 MG/DL (ref 65–99)
HCT VFR BLD AUTO: 45.6 % (ref 37.5–51)
HDLC SERPL-MCNC: 58 MG/DL (ref 40–60)
HGB BLD-MCNC: 14.6 G/DL (ref 13–17.7)
IMM GRANULOCYTES # BLD AUTO: 0.02 10*3/MM3 (ref 0–0.05)
IMM GRANULOCYTES NFR BLD AUTO: 0.4 % (ref 0–0.5)
LDLC SERPL CALC-MCNC: 84 MG/DL (ref 0–100)
LYMPHOCYTES # BLD AUTO: 1.82 10*3/MM3 (ref 0.7–3.1)
LYMPHOCYTES NFR BLD AUTO: 32.9 % (ref 19.6–45.3)
MCH RBC QN AUTO: 28.9 PG (ref 26.6–33)
MCHC RBC AUTO-ENTMCNC: 32 G/DL (ref 31.5–35.7)
MCV RBC AUTO: 90.1 FL (ref 79–97)
MONOCYTES # BLD AUTO: 0.66 10*3/MM3 (ref 0.1–0.9)
MONOCYTES NFR BLD AUTO: 11.9 % (ref 5–12)
NEUTROPHILS # BLD AUTO: 2.88 10*3/MM3 (ref 1.7–7)
NEUTROPHILS NFR BLD AUTO: 52.1 % (ref 42.7–76)
NRBC BLD AUTO-RTO: 0 /100 WBC (ref 0–0.2)
PLATELET # BLD AUTO: 197 10*3/MM3 (ref 140–450)
POTASSIUM SERPL-SCNC: 4.4 MMOL/L (ref 3.5–5.2)
PROT SERPL-MCNC: 6.7 G/DL (ref 6–8.5)
RBC # BLD AUTO: 5.06 10*6/MM3 (ref 4.14–5.8)
SODIUM SERPL-SCNC: 140 MMOL/L (ref 136–145)
TRIGL SERPL-MCNC: 48 MG/DL (ref 0–150)
VLDLC SERPL CALC-MCNC: 10 MG/DL (ref 5–40)
WBC # BLD AUTO: 5.53 10*3/MM3 (ref 3.4–10.8)

## 2025-03-05 ENCOUNTER — TELEPHONE (OUTPATIENT)
Dept: INTERNAL MEDICINE | Facility: CLINIC | Age: 60
End: 2025-03-05

## 2025-03-05 ENCOUNTER — OFFICE VISIT (OUTPATIENT)
Dept: INTERNAL MEDICINE | Facility: CLINIC | Age: 60
End: 2025-03-05
Payer: COMMERCIAL

## 2025-03-05 VITALS
OXYGEN SATURATION: 98 % | TEMPERATURE: 98.4 F | DIASTOLIC BLOOD PRESSURE: 86 MMHG | HEIGHT: 70 IN | HEART RATE: 78 BPM | BODY MASS INDEX: 29.46 KG/M2 | SYSTOLIC BLOOD PRESSURE: 124 MMHG | WEIGHT: 205.8 LBS

## 2025-03-05 DIAGNOSIS — I10 PRIMARY HYPERTENSION: ICD-10-CM

## 2025-03-05 DIAGNOSIS — Z12.11 ENCOUNTER FOR SCREENING COLONOSCOPY: ICD-10-CM

## 2025-03-05 DIAGNOSIS — E78.5 HYPERLIPIDEMIA LDL GOAL <100: ICD-10-CM

## 2025-03-05 DIAGNOSIS — Z00.00 ANNUAL PHYSICAL EXAM: Primary | ICD-10-CM

## 2025-03-05 DIAGNOSIS — Z12.5 SCREENING PSA (PROSTATE SPECIFIC ANTIGEN): ICD-10-CM

## 2025-03-05 RX ORDER — MULTIVIT WITH MINERALS/LUTEIN
250 TABLET ORAL DAILY
COMMUNITY

## 2025-03-05 NOTE — PROGRESS NOTES
"Annual Exam      Asad Jones is being seen for a Complete physical exam. His last physical was 2-5-2018.     Social: He is working full time as  business owner/surveying self employed. He is . His household includes wife and 3 daughters.     Lifestyle: He is not exercising regularly. He does 0 use tobacco. He drinks alcohol never.    Screening: Colonoscopy was completed 3-, recall 5 years.  Last labs reviewed from 2- included a lipid panel, PSA, CBC, and CMP.       History of Present Illness     He is followed by cardiology for HTN and hyperlipidemia after developing CP. He was seen by cardiology  ED with Tyson Cole MD (11/14/2024) He was started on Lisinopril for HTN and Lipitor 20mg for hyperlipidemia.     The following portions of the patient's history were reviewed and updated as appropriate: allergies, current medications, past family history, past medical history, past social history, past surgical history, and problem list.    Review of Systems   Constitutional: Negative.    HENT: Negative.     Eyes: Negative.    Respiratory: Negative.     Cardiovascular: Negative.  Negative for chest pain and leg swelling.   Gastrointestinal: Negative.    Endocrine: Negative.    Genitourinary: Negative.    Musculoskeletal: Negative.    Allergic/Immunologic: Negative.    Neurological: Negative.  Negative for dizziness and facial asymmetry.   Hematological: Negative.  Negative for adenopathy. Does not bruise/bleed easily.   Psychiatric/Behavioral: Negative.     All other systems reviewed and are negative.      Objective          /86 (BP Location: Left arm, Patient Position: Sitting, Cuff Size: Adult)   Pulse 78   Temp 98.4 °F (36.9 °C) (Infrared)   Ht 177.8 cm (70\")   Wt 93.4 kg (205 lb 12.8 oz)   SpO2 98%   BMI 29.53 kg/m²     General Appearance:    Alert, cooperative, no distress, appears stated age   Head:    Normocephalic, without obvious abnormality, atraumatic "   Eyes:    PERRL, conjunctiva/corneas clear, EOM's intact, fundi     benign, both eyes        Ears:    Normal TM's and external ear canals, both ears   Nose:   Nares normal, septum midline, mucosa normal, no drainage    or sinus tenderness   Throat:   Lips, mucosa, and tongue normal; teeth and gums normal   Neck:   Supple, symmetrical, trachea midline, no adenopathy;        thyroid:  No enlargement/tenderness/nodules; no carotid    bruit or JVD   Back:     Symmetric, no curvature, ROM normal, no CVA tenderness   Lungs:     Clear to auscultation bilaterally, respirations unlabored   Chest wall:    No tenderness or deformity   Heart:    Regular rate and rhythm, S1 and S2 normal, no murmur, rub    or gallop   Abdomen:     Soft, non-tender, bowel sounds active all four quadrants,     no masses, no organomegaly   Genitalia:    deferred   Rectal:    defered   Extremities:   Extremities normal, atraumatic, no cyanosis or edema   Pulses:   2+ and symmetric all extremities   Skin:   Skin color, texture, turgor normal, no rashes or lesions   Lymph nodes:   Cervical, supraclavicular, and axillary nodes normal   Neurologic:   CNII-XII intact. Normal strength, sensation and reflexes      throughout              Assessment & Plan   Diagnoses and all orders for this visit:    1. Annual physical exam (Primary)    2. Primary hypertension    3. Hyperlipidemia LDL goal <100  -     Comprehensive Metabolic Panel; Future  -     CBC & Differential; Future  -     Lipid Panel With / Chol / HDL Ratio; Future    4. Encounter for screening colonoscopy  -     Ambulatory Referral For Screening Colonoscopy    5. Screening PSA (prostate specific antigen)  -     PSA Screen; Future    Other orders  -     Cancel: Cologuard - Stool, Per Rectum; Future        Preventive counseling: Immunizations reviewed, declines. Needs to start an exercise regimen. Refer for colonscopy.     Follow up in 1 year

## 2025-03-05 NOTE — TELEPHONE ENCOUNTER
Faxed orders for XR shoulder, Coronary calcium score and Vascual screening to [facility]. [phone]

## 2025-03-11 ENCOUNTER — TELEPHONE (OUTPATIENT)
Dept: INTERNAL MEDICINE | Facility: CLINIC | Age: 60
End: 2025-03-11
Payer: COMMERCIAL

## 2025-03-11 ENCOUNTER — TELEPHONE (OUTPATIENT)
Dept: INTERNAL MEDICINE | Facility: CLINIC | Age: 60
End: 2025-03-11

## 2025-03-11 NOTE — TELEPHONE ENCOUNTER
"  Hub staff attempted to follow warm transfer process and was unsuccessful     Caller: Asad Jones W \"Antonio\"    Relationship to patient: Self    Best call back number: [phone]     Patient is needing: PATIENT IS CALLING CHECKING ON TEST ORDERS AND REFERRALS. PLEASE CALL PATIENT BACK.        "

## 2025-03-27 RX ORDER — ATORVASTATIN CALCIUM 20 MG/1
20 TABLET, FILM COATED ORAL NIGHTLY
Qty: 90 TABLET | Refills: 1 | Status: SHIPPED | OUTPATIENT
Start: 2025-03-27

## 2025-03-27 NOTE — TELEPHONE ENCOUNTER
Rx Refill Note  Requested Prescriptions     Pending Prescriptions Disp Refills    atorvastatin (LIPITOR) 20 MG tablet [Pharmacy Med Name: ATORVASTATIN 20 MG TABLET] 90 tablet 1     Sig: TAKE ONE TABLET BY MOUTH ONCE NIGHTLY      Last office visit with prescribing clinician: 3/5/2025   Last telemedicine visit with prescribing clinician: Visit date not found   Next office visit with prescribing clinician: 3/11/2026                         Would you like a call back once the refill request has been completed: [] Yes [] No    If the office needs to give you a call back, can they leave a voicemail: [] Yes [] No    Rut Hopper MA  03/27/25, 16:09 EDT

## 2025-04-25 DIAGNOSIS — R29.898 RIGHT ARM WEAKNESS: ICD-10-CM

## 2025-05-02 ENCOUNTER — RESULTS FOLLOW-UP (OUTPATIENT)
Dept: NEUROLOGY | Facility: CLINIC | Age: 60
End: 2025-05-02
Payer: COMMERCIAL

## 2025-06-25 DIAGNOSIS — I10 PRIMARY HYPERTENSION: ICD-10-CM

## 2025-06-26 RX ORDER — LISINOPRIL 10 MG/1
10 TABLET ORAL DAILY
Qty: 90 TABLET | Refills: 1 | Status: SHIPPED | OUTPATIENT
Start: 2025-06-26

## 2025-06-26 NOTE — TELEPHONE ENCOUNTER
Rx Refill Note  Requested Prescriptions     Pending Prescriptions Disp Refills    lisinopril (PRINIVIL,ZESTRIL) 10 MG tablet [Pharmacy Med Name: LISINOPRIL 10 MG TABLET] 90 tablet 1     Sig: TAKE 1 TABLET BY MOUTH DAILY      Last office visit with prescribing clinician: 3/5/2025   Last telemedicine visit with prescribing clinician: Visit date not found   Next office visit with prescribing clinician: 3/11/2026                         Would you like a call back once the refill request has been completed: [] Yes [] No    If the office needs to give you a call back, can they leave a voicemail: [] Yes [] No    Giovany France MA  06/26/25, 11:50 EDT

## 2025-08-07 ENCOUNTER — NURSE TRIAGE (OUTPATIENT)
Dept: CALL CENTER | Facility: HOSPITAL | Age: 60
End: 2025-08-07
Payer: COMMERCIAL

## 2025-08-07 ENCOUNTER — TELEPHONE (OUTPATIENT)
Dept: INTERNAL MEDICINE | Facility: CLINIC | Age: 60
End: 2025-08-07
Payer: COMMERCIAL

## 2025-08-08 ENCOUNTER — OFFICE VISIT (OUTPATIENT)
Dept: INTERNAL MEDICINE | Facility: CLINIC | Age: 60
End: 2025-08-08
Payer: COMMERCIAL

## 2025-08-08 ENCOUNTER — TELEPHONE (OUTPATIENT)
Dept: CARDIOLOGY | Age: 60
End: 2025-08-08
Payer: COMMERCIAL

## 2025-08-08 VITALS
HEIGHT: 70 IN | SYSTOLIC BLOOD PRESSURE: 138 MMHG | OXYGEN SATURATION: 98 % | DIASTOLIC BLOOD PRESSURE: 82 MMHG | BODY MASS INDEX: 29.95 KG/M2 | WEIGHT: 209.2 LBS | HEART RATE: 62 BPM | TEMPERATURE: 98.5 F

## 2025-08-08 DIAGNOSIS — I10 PRIMARY HYPERTENSION: Primary | ICD-10-CM

## 2025-08-08 DIAGNOSIS — E78.5 HYPERLIPIDEMIA LDL GOAL <70: ICD-10-CM

## 2025-08-08 DIAGNOSIS — R20.0 LEFT ARM NUMBNESS: ICD-10-CM

## 2025-08-08 PROCEDURE — 99214 OFFICE O/P EST MOD 30 MIN: CPT | Performed by: NURSE PRACTITIONER

## 2025-08-08 RX ORDER — LOSARTAN POTASSIUM 50 MG/1
50 TABLET ORAL DAILY
Qty: 30 TABLET | Refills: 0 | Status: SHIPPED | OUTPATIENT
Start: 2025-08-08 | End: 2025-08-11 | Stop reason: SDUPTHER

## 2025-08-08 RX ORDER — ATORVASTATIN CALCIUM 40 MG/1
40 TABLET, FILM COATED ORAL NIGHTLY
Qty: 90 TABLET | Refills: 1 | Status: SHIPPED | OUTPATIENT
Start: 2025-08-08

## 2025-08-11 DIAGNOSIS — R68.84 JAW PAIN: ICD-10-CM

## 2025-08-11 DIAGNOSIS — R06.09 DYSPNEA ON EXERTION: ICD-10-CM

## 2025-08-11 DIAGNOSIS — R93.1 AGATSTON CORONARY ARTERY CALCIUM SCORE GREATER THAN 400: Primary | ICD-10-CM

## 2025-08-11 RX ORDER — LOSARTAN POTASSIUM 100 MG/1
100 TABLET ORAL DAILY
Qty: 30 TABLET | Refills: 0 | Status: SHIPPED | OUTPATIENT
Start: 2025-08-11

## 2025-08-12 ENCOUNTER — HOSPITAL ENCOUNTER (OUTPATIENT)
Dept: CARDIOLOGY | Facility: HOSPITAL | Age: 60
Discharge: HOME | End: 2025-08-12
Admitting: STUDENT IN AN ORGANIZED HEALTH CARE EDUCATION/TRAINING PROGRAM
Payer: COMMERCIAL

## 2025-08-12 VITALS — HEIGHT: 70 IN | WEIGHT: 209.22 LBS | BODY MASS INDEX: 29.95 KG/M2

## 2025-08-12 DIAGNOSIS — R93.1 AGATSTON CORONARY ARTERY CALCIUM SCORE GREATER THAN 400: ICD-10-CM

## 2025-08-12 DIAGNOSIS — R68.84 JAW PAIN: ICD-10-CM

## 2025-08-12 DIAGNOSIS — R06.09 DYSPNEA ON EXERTION: ICD-10-CM

## 2025-08-12 LAB
BH CV NUCLEAR PRIOR STUDY: 2
BH CV REST NUCLEAR ISOTOPE DOSE: 10.5 MCI
BH CV STRESS BP STAGE 1: NORMAL
BH CV STRESS BP STAGE 2: NORMAL
BH CV STRESS BP STAGE 3: NORMAL
BH CV STRESS DURATION MIN STAGE 1: 3
BH CV STRESS DURATION MIN STAGE 2: 3
BH CV STRESS DURATION MIN STAGE 3: 3
BH CV STRESS DURATION SEC STAGE 1: 0
BH CV STRESS DURATION SEC STAGE 2: 0
BH CV STRESS DURATION SEC STAGE 3: 0
BH CV STRESS GRADE STAGE 1: 10
BH CV STRESS GRADE STAGE 2: 12
BH CV STRESS GRADE STAGE 3: 14
BH CV STRESS HR STAGE 1: 110
BH CV STRESS HR STAGE 2: 135
BH CV STRESS HR STAGE 3: 143
BH CV STRESS METS STAGE 1: 5
BH CV STRESS METS STAGE 2: 7.5
BH CV STRESS METS STAGE 3: 10
BH CV STRESS NUCLEAR ISOTOPE DOSE: 33.7 MCI
BH CV STRESS PROTOCOL 1: NORMAL
BH CV STRESS RECOVERY BP: NORMAL MMHG
BH CV STRESS RECOVERY HR: 99 BPM
BH CV STRESS SPEED STAGE 1: 1.7
BH CV STRESS SPEED STAGE 2: 2.5
BH CV STRESS SPEED STAGE 3: 3.4
BH CV STRESS STAGE 1: 1
BH CV STRESS STAGE 2: 2
BH CV STRESS STAGE 3: 3
MAXIMAL PREDICTED HEART RATE: 160 BPM
PERCENT MAX PREDICTED HR: 89.38 %
SPECT HRT GATED+EF W RNC IV: 49 %
STRESS BASELINE BP: NORMAL MMHG
STRESS BASELINE HR: 67 BPM
STRESS PERCENT HR: 105 %
STRESS POST ESTIMATED WORKLOAD: 10 METS
STRESS POST EXERCISE DUR MIN: 9 MIN
STRESS POST EXERCISE DUR SEC: 0 SEC
STRESS POST PEAK BP: NORMAL MMHG
STRESS POST PEAK HR: 143 BPM
STRESS TARGET HR: 136 BPM

## 2025-08-12 PROCEDURE — 78452 HT MUSCLE IMAGE SPECT MULT: CPT | Performed by: INTERNAL MEDICINE

## 2025-08-12 PROCEDURE — 34310000005 TECHNETIUM TETROFOSMIN KIT: Performed by: STUDENT IN AN ORGANIZED HEALTH CARE EDUCATION/TRAINING PROGRAM

## 2025-08-12 PROCEDURE — 93017 CV STRESS TEST TRACING ONLY: CPT

## 2025-08-12 PROCEDURE — 93018 CV STRESS TEST I&R ONLY: CPT | Performed by: INTERNAL MEDICINE

## 2025-08-12 PROCEDURE — 78452 HT MUSCLE IMAGE SPECT MULT: CPT

## 2025-08-12 PROCEDURE — A9502 TC99M TETROFOSMIN: HCPCS | Performed by: STUDENT IN AN ORGANIZED HEALTH CARE EDUCATION/TRAINING PROGRAM

## 2025-08-12 PROCEDURE — 93016 CV STRESS TEST SUPVJ ONLY: CPT | Performed by: INTERNAL MEDICINE

## 2025-08-12 RX ADMIN — TETROFOSMIN 1 DOSE: 1.38 INJECTION, POWDER, LYOPHILIZED, FOR SOLUTION INTRAVENOUS at 12:58

## 2025-08-12 RX ADMIN — TETROFOSMIN 1 DOSE: 1.38 INJECTION, POWDER, LYOPHILIZED, FOR SOLUTION INTRAVENOUS at 11:56

## 2025-08-25 ENCOUNTER — OFFICE VISIT (OUTPATIENT)
Dept: INTERNAL MEDICINE | Facility: CLINIC | Age: 60
End: 2025-08-25
Payer: COMMERCIAL

## 2025-08-25 VITALS
TEMPERATURE: 98.2 F | OXYGEN SATURATION: 98 % | BODY MASS INDEX: 29.38 KG/M2 | DIASTOLIC BLOOD PRESSURE: 72 MMHG | SYSTOLIC BLOOD PRESSURE: 122 MMHG | WEIGHT: 205.2 LBS | HEIGHT: 70 IN | HEART RATE: 71 BPM

## 2025-08-25 DIAGNOSIS — M50.30 DDD (DEGENERATIVE DISC DISEASE), CERVICAL: ICD-10-CM

## 2025-08-25 DIAGNOSIS — I10 PRIMARY HYPERTENSION: Primary | ICD-10-CM

## 2025-08-25 DIAGNOSIS — Z12.11 SCREENING FOR COLON CANCER: ICD-10-CM

## 2025-08-25 PROCEDURE — 99214 OFFICE O/P EST MOD 30 MIN: CPT | Performed by: NURSE PRACTITIONER

## 2025-08-25 RX ORDER — LOSARTAN POTASSIUM 100 MG/1
100 TABLET ORAL DAILY
Qty: 90 TABLET | Refills: 1 | Status: SHIPPED | OUTPATIENT
Start: 2025-08-25

## (undated) DEVICE — MASK,FACE,FLUID RESIST,SHLD,EARLOOP: Brand: MEDLINE

## (undated) DEVICE — Device

## (undated) DEVICE — BW-412T DISP COMBO CLEANING BRUSH: Brand: SINGLE USE COMBINATION CLEANING BRUSH

## (undated) DEVICE — SPNG GZ WOVN 4X4IN 12PLY 10/BX STRL

## (undated) DEVICE — Device: Brand: DEFENDO AIR/WATER/SUCTION AND BIOPSY VALVE

## (undated) DEVICE — JACKT LAB KNIT COLR LG BLU

## (undated) DEVICE — GOWN ISOL W/THUMB UNIV BLU BX/15

## (undated) DEVICE — SUCTION CANISTER, 3000CC,SAFELINER: Brand: DEROYAL

## (undated) DEVICE — FRCP BX RADJAW4 NDL 2.8 240CM LG OG BX40

## (undated) DEVICE — SYR LL 3CC

## (undated) DEVICE — VIAL FORMALIN CAP 10P 40ML

## (undated) DEVICE — SYR LUER SLPTP 50ML